# Patient Record
Sex: MALE | Race: WHITE | Employment: FULL TIME | URBAN - METROPOLITAN AREA
[De-identification: names, ages, dates, MRNs, and addresses within clinical notes are randomized per-mention and may not be internally consistent; named-entity substitution may affect disease eponyms.]

---

## 2017-01-18 PROBLEM — I10 ESSENTIAL (PRIMARY) HYPERTENSION: Status: ACTIVE | Noted: 2017-01-18

## 2017-10-12 ENCOUNTER — HOSPITAL ENCOUNTER (OUTPATIENT)
Dept: ULTRASOUND IMAGING | Age: 37
Discharge: HOME | End: 2017-10-12
Attending: INTERNAL MEDICINE
Payer: COMMERCIAL

## 2017-10-12 DIAGNOSIS — M79.89 LEFT ARM SWELLING: ICD-10-CM

## 2017-10-12 PROCEDURE — 93971 EXTREMITY STUDY: CPT

## 2018-04-02 PROBLEM — E66.01 OBESITY, MORBID (HCC): Status: ACTIVE | Noted: 2018-04-02

## 2018-08-27 ENCOUNTER — HOSPITAL ENCOUNTER (OUTPATIENT)
Dept: PHYSICAL THERAPY | Age: 38
Discharge: HOME | End: 2018-08-27
Attending: INTERNAL MEDICINE
Payer: COMMERCIAL

## 2018-08-27 DIAGNOSIS — S43.422A SPRAIN OF LEFT ROTATOR CUFF CAPSULE, INITIAL ENCOUNTER: ICD-10-CM

## 2018-08-27 PROCEDURE — 97140 MANUAL THERAPY 1/> REGIONS: CPT

## 2018-08-27 PROCEDURE — 97161 PT EVAL LOW COMPLEX 20 MIN: CPT

## 2018-08-27 NOTE — THERAPY EVALUATION
Melody David  : 1980  Primary: [facility]  Secondary:  [facility] at [address], [address] [place] [address], [address]  [phone]   [phone]       OUTPATIENT PHYSICAL THERAPY:Initial Assessment 2018    ICD-10: Treatment Diagnosis: Pain in left shoulder (M25.512)    Stiffness of left shoulder, not elsewhere classified (M25.612)    Precautions/Allergies:   Review of patient's allergies indicates no known allergies. Fall Risk Score: 1 (? 5 = High Risk)  MD Orders: Evaluate and treat MEDICAL/REFERRING DIAGNOSIS:  Sprain of left rotator cuff capsule, initial encounter [C51.990T]   DATE OF ONSET: 2017  REFERRING PHYSICIAN: Jeremie Oliveros MD  RETURN PHYSICIAN APPOINTMENT: 2018   This section established at most recent assessment  ASSESSMENT:  Melody David is a 42 y.o. male who presents to physical therapy for sudden onset of L shoulder pain. This session, pt demonstrated decreased L shoulder strength/endurance, pain with L shoulder mobility, multiple postural deficits, and decreased functional mobility as evident by a score of 22/55 on the Disabilities of the Arm, Shoulder, and Hand Questionnaire (with higher scores indicating increased disability). Pt may benefit from skilled PT to address the above listed deficits to improve ability to perform pain-free ADLs/IADLs and to improve overall quality of life prior to discharge. PROBLEM LIST (Impacting functional limitations):  1. Decreased Strength  2. Decreased ADL/Functional Activities  3. Increased Pain  4. Decreased Flexibility/Joint Mobility  5. Edema/Girth INTERVENTIONS PLANNED:  1. Bed Mobility  2. Cold  3. Family Education  4. Heat  5. Home Exercise Program (HEP)  6. Manual Therapy  7. Neuromuscular Re-education/Strengthening  8. Range of Motion (ROM)  9. Therapeutic Activites  10. Therapeutic Exercise/Strengthening  11.  Transfer Training  12. Ultrasound (US)   TREATMENT PLAN:  Effective Dates: 8/27/2018 TO 10/26/2018 (60 days). Frequency/Duration: 1 time every other week for 60 Days  GOALS: (Goals have been discussed and agreed upon with patient.)  Discharge Goals: Time Frame: 60 days  1. Pt will be compliant and independent with HEP in order to increase UE strength/endurance/mobility to improve functional mobility and overall quality of life. 2. Pt will improve score on the Disabilities of the Arm, Shoulder, and Hand (DASH) Questionnaire to 11/55 in order to improve independence with ADLs and IADLs and to improve overall quality of life. 3. Pt will be able to demonstrate good body mechanics while lifting 20 lb object up from the floor in order to minimize pain while doing yardwork. Rehabilitation Potential For Stated Goals: Good  Regarding Son Lyman's therapy, I certify that the treatment plan above will be carried out by a therapist or under their direction. Thank you for this referral,  Luc Taylor DPT     Referring Physician Signature: Raegan Kelly MD              Date                    HISTORY:   History of Present Injury/Illness (Reason for Referral): *History per pt or pt's family except where otherwise noted  Pt states he started having pain in his L shoulder in December 2017, and it has progressively gotten worse over the past couple of weeks (states he did not notice any specific cause, but thinks it might be due to weightlifting or yardwork, or assisting his mom who had had a stroke and was living with him at the time). The pain at worst is 6-7/10 (aggravating activities include reaching behind him to grab something, trying to open a door, pulling heavy box out of truck, and weightlifting activities such as overhead press, pull downs, bicep curls, chest press). The pain at best is 0/10 (eases pain with ibuprofen and rest). The pain takes 30-40 minutes to ease off when it is aggravated.  Pt states occasionally he feels radiating pain down posterior L UE to elbow (does not feel pain past elbow)  Past Medical History/Comorbidities:   Mr. Hugh Finley  has a past medical history of Acquired hypothyroidism; Allergic rhinitis, cause unspecified; Essential (primary) hypertension (1/18/2017); Hashimoto's thyroiditis (x 13 yrs); Hypercholesterolemia; and Morbidly obese ([address] Mr. Hugh Finley  has a past surgical history that includes hx heent (1995). Social History/Living Environment:    lives with 20 month old son in 2 story house with 25 steps with rail on L going up (only goes up about twice a month); girlfriend comes over every day; has ramp into house; does yardwork (shoveling, riding mower, weedeat, dig holes for planting trees  Prior Level of Function/Work/Activity:  Works at [employer] (as  - has to walk around and make sure everything is up to code - does not have to do repairs himself); likes to do heavy weightlifting (but has had to ease off since injury)  Dominant Side:         RIGHT  Other Clinical Tests:          None yet (not covered by insurance yet)  Previous Treatment Approaches:          none  Personal Factors:          Sex:  male        Age:  42 y.o. Current Medications:    Current Outpatient Prescriptions:     losartan-hydroCHLOROthiazide (HYZAAR) 100-25 mg per tablet, Take 1 Tab by mouth daily. , Disp: 90 Tab, Rfl: 1    levothyroxine (SYNTHROID) 175 mcg tablet, Take 1 Tab by mouth Daily (before breakfast). , Disp: 90 Tab, Rfl: 1    fluticasone (FLONASE) 50 mcg/actuation nasal spray, 2 Sprays by Both Nostrils route daily. , Disp: 1 Bottle, Rfl: 0   Date Last Reviewed:  8/27/2018   # of Personal Factors/Comorbidities that affect the Plan of Care: 1-2: MODERATE COMPLEXITY   EXAMINATION:   Initial assessment on 8/27/2018  Observation/Orthostatic Postural Assessment:    The following postural deficits were noted in sitting:  loss of cervical lordosis, increased thoracic kyphosis, forward head, rounded shoulders, elevated B shoulders, posterior pelvic tilt   The following postural deficits were noted in standing: slight forward trunk flexion  Palpation:          No tenderness with palpation  ROM:    Initial measurement: (on 8/27/2018) Initial measurement: (on 8/27/2018) Reassessment measurement:  Reassessment measurement:    L UE: WFL, but painful with end-range shoulder flexion, ER, and IR in lateral L shoulder R UE: WFL         Initial measurement: (on 8/27/2018) Reassessment measurement: Reassessment measurement:    [facility]    Thoracic AROM WFL            Strength:    Initial measurement: (on 8/27/2018) Initial measurement: (on 8/27/2018) Reassessment measurement:  Reassessment measurement:    L UE:  Shoulder flexion: 4+/5 (slight pain in lateral shoulder)  Shoulder extension: 5/5  Shoulder abduction: 4/5   Shoulder adduction: 5/5  Shoulder IR: 5/5  Shoulder ER: 4/5  Elbow flexion: 5/5  Elbow extension: 5/5 R UE:  Shoulder flexion: 4+/5  Shoulder extension: 5/5  Shoulder abduction: 4+/5  Shoulder adduction: 5/5  Shoulder IR: 5/5  Shoulder ER: 4+/5  Elbow flexion: 5/5  Elbow extension: 5/5       Special Tests:          Empty can test: + for weakness and more pain with IR at L shoulder  Neer's impingement test: + for pain at L and R with IR  Hawkin's-Brenton test: -  Neurological Screen:        Myotomes:  WFL        Dermatomes:  WFL     Body Structures Involved:  1. Nerves  2. Bones  3. Joints  4. Muscles  5. Ligaments Body Functions Affected:  1. Sensory/Pain  2. Neuromusculoskeletal  3. Movement Related Activities and Participation Affected:  1. General Tasks and Demands  2. Mobility  3. Self Care  4. Domestic Life  5. Interpersonal Interactions and Relationships  6.  Community, Social and [place]   # of elements that affect the Plan of Care: 4+: HIGH COMPLEXITY   CLINICAL PRESENTATION:   Presentation: Stable and uncomplicated: LOW COMPLEXITY   CLINICAL DECISION MAKING:   Outcome Measure: Tool Used: Disabilities of the Arm, Shoulder and Hand (DASH) Questionnaire - Quick Version  Score:  Initial: 22/55  Most Recent: X/55 (Date: -- )   Interpretation of Score: The DASH is designed to measure the activities of daily living in person's with upper extremity dysfunction or pain. Each section is scored on a 1-5 scale, 5 representing the greatest disability. The scores of each section are added together for a total score of 55. Score 11 12-19 20-28 29-37 38-45 46-54 55   Modifier CH CI CJ CK CL CM CN     Payor: BLUE CROSS / Plan: [place] BLUE CROSS [facility] / Product Type: PPO /   Medical Necessity:   · Patient is expected to demonstrate progress in strength, range of motion and functional technique to improve ability to perform pain-free ADLs/IADLs and to improve overall quality of life. · Patient demonstrates good rehab potential due to higher previous functional level. Reason for Services/Other Comments:  · Patient continues to require modification of therapeutic interventions to increase complexity of exercises. Use of outcome tool(s) and clinical judgement create a POC that gives a: Questionable prediction of patient's progress: MODERATE COMPLEXITY   TREATMENT:   (In addition to Assessment/Re-Assessment sessions the following treatments were rendered)  Subjective comments at beginning of session: See history above  THERAPEUTIC EXERCISE: (15 minutes):  Exercises per grid below to improve mobility, strength and dynamic movement of shoulder - left to improve functional lifting, carrying, reaching and overhead activites. Required minimal visual, verbal and manual cues to promote proper body alignment, promote proper body posture, promote proper body mechanics and promote proper body breathing techniques. Progressed resistance, range, repetitions and complexity of movement as indicated.     Date:  8/27/2018 Date:   Date:   Activity/Exercise Parameters Parameters    Standing shoulder ER/IR* Yellow theratubing resistance; 10-12 reps/1 set L UE each direction; cues for upright posture throughout     Standing shoulder rows* 17.5 lb resistance; 20 reps/1 set; cues for avoiding shoulder elevation; cues for scapular retraction and good posture throughout     Standing shoulder extension* 17.5 lb resistance; 20 reps/1 set B UEs; cues to maintain elbow extension and avoid shoulder elevation; cues to maintain neutral cervical position     Standing shoulder scaption* 3 lb resistance; 15 reps/1 set B UEs with cues to avoid scaption past 90 degrees; cues for correct movement pattern                       *given in HEP  [employer] Portal      Treatment/Session Assessment:  See assessment above. · Pain/ Symptoms: Initial:   No specific pain level verbalized at beginning of session Post Session:  No change in pain level stated at end of session ·   Compliance with Program/Exercises: Will assess as treatment progresses. · Recommendations/Intent for next treatment session: \"Next visit will focus on advancements to more challenging activities and reduction in assistance provided\".  Work on developing HEP for scapular stabilization and stretching tight muscles  Total Treatment Duration:  PT Patient Time In/Time Out  Time In: 1057  Time Out: [address], DPT    Future Appointments  Date Time Provider Renae Soler   9/10/2018 11:15 AM Zora Chang DPT [facility]   9/28/2018 8:00 AM Jay Randhawa MD [facility]

## 2018-08-27 NOTE — PROGRESS NOTES
Ambulatory/Rehab Services H2 Model Falls Risk Assessment    Risk Factor Pts. ·   Confusion/Disorientation/Impulsivity  []    4 ·   Symptomatic Depression  []   2 ·   Altered Elimination  []   1 ·   Dizziness/Vertigo  []   1 ·   Gender (Male)  [x]   1 ·   Any administered antiepileptics (anticonvulsants):  []   2 ·   Any administered benzodiazepines:  []   1 ·   Visual Impairment (specify):  []   1 ·   Portable Oxygen Use  []   1 ·   Orthostatic ? BP  []   1 ·   History of Recent Falls (within 3 mos.)  []   5     Ability to Rise from Chair (choose one) Pts. ·   Ability to rise in a single movement  [x]   0 ·   Pushes up, successful in one attempt  []   1 ·   Multiple attempts, but successful  []   3 ·   Unable to rise without assistance  []   4   Total: (5 or greater = High Risk) 1     Falls Prevention Plan:   []                Physical Limitations to Exercise (specify):   []                Mobility Assistance Device (type):   []                Exercise/Equipment Adaptation (specify):    ©2010 [facility] [address] Patent #[id number].  Federal Law prohibits the replication, distribution or use without written permission from [facility] [employer]

## 2018-08-27 NOTE — PROGRESS NOTES
Shoulder Rows    Instructions: Holding elastic band with both hands (palms facing each other), draw back the band as you bend your elbows and squeeze your shoulder blades together. Keep your elbows near the side of your body. Return to starting position and repeat. Repeat: 20 times Complete: 1 sets Perform: 1 times/day     Shoulder Extension    Instructions: Holding elastic band with both hands with both arms in front of you with your elbows straight, pull the band downwards and back towards your sides. Avoid elevating your shoulders during the motion. Repeat: 20 times Complete: 1 sets Perform: 1 times/day     Shoulder External Rotation    Instructions: While holding an elastic band at your side with your elbow bent, start with your hand near your stomach and then pull the band away. Keep your elbow at your side the entire time. Repeat: 20 times Complete: 1 sets Perform: 1 times/day     Shoulder Internal Rotation    Instructions: While holding an elastic band at your side with your elbow bent, start with your hand away from your stomach, then pull the band towards your stomach. Keep you elbow near your side the entire time.     Repeat: 20 times Complete: 1 sets Perform: 1 times/day

## 2018-09-10 ENCOUNTER — HOSPITAL ENCOUNTER (OUTPATIENT)
Dept: PHYSICAL THERAPY | Age: 38
Discharge: HOME | End: 2018-09-10
Attending: INTERNAL MEDICINE
Payer: COMMERCIAL

## 2018-09-10 PROCEDURE — 97110 THERAPEUTIC EXERCISES: CPT

## 2018-09-10 NOTE — PROGRESS NOTES
Renetta Cortes  : 1980  Primary: [facility]  Secondary:  Therapy Center at [address] [place] [address], [place], [facility]  [phone]   [phone]       OUTPATIENT PHYSICAL THERAPY:Daily Note 9/10/2018    ICD-10: Treatment Diagnosis: Pain in left shoulder (M25.512)    Stiffness of left shoulder, not elsewhere classified (M25.612)    Precautions/Allergies:   Review of patient's allergies indicates no known allergies. Fall Risk Score: 1 (? 5 = High Risk)  MD Orders: Evaluate and treat MEDICAL/REFERRING DIAGNOSIS:  Sprain of left rotator cuff capsule, initial encounter [B42.736C]   DATE OF ONSET: 2017  REFERRING PHYSICIAN: Darian Kwan MD  RETURN PHYSICIAN APPOINTMENT: 2018   This section established at most recent assessment  ASSESSMENT:  Renetta Cortes is a 42 y.o. male who presents to physical therapy for sudden onset of L shoulder pain. This session, pt demonstrated decreased L shoulder strength/endurance, pain with L shoulder mobility, multiple postural deficits, and decreased functional mobility as evident by a score of 22/55 on the Disabilities of the Arm, Shoulder, and Hand Questionnaire (with higher scores indicating increased disability). Pt may benefit from skilled PT to address the above listed deficits to improve ability to perform pain-free ADLs/IADLs and to improve overall quality of life prior to discharge. PROBLEM LIST (Impacting functional limitations):  1. Decreased Strength  2. Decreased ADL/Functional Activities  3. Increased Pain  4. Decreased Flexibility/Joint Mobility  5. Edema/Girth INTERVENTIONS PLANNED:  1. Bed Mobility  2. Cold  3. Family Education  4. Heat  5. Home Exercise Program (HEP)  6. Manual Therapy  7. Neuromuscular Re-education/Strengthening  8. Range of Motion (ROM)  9. Therapeutic Activites  10. Therapeutic Exercise/Strengthening  11.  Transfer Training  12. Ultrasound (US)   TREATMENT PLAN:  Effective Dates: 8/27/2018 TO 10/26/2018 (60 days). Frequency/Duration: 1 time every other week for 60 Days  GOALS: (Goals have been discussed and agreed upon with patient.)  Discharge Goals: Time Frame: 60 days  1. Pt will be compliant and independent with HEP in order to increase UE strength/endurance/mobility to improve functional mobility and overall quality of life. 2. Pt will improve score on the Disabilities of the Arm, Shoulder, and Hand (DASH) Questionnaire to 11/55 in order to improve independence with ADLs and IADLs and to improve overall quality of life. 3. Pt will be able to demonstrate good body mechanics while lifting 20 lb object up from the floor in order to minimize pain while doing yardwork. Rehabilitation Potential For Stated Goals: Good  Regarding Darion Vargasariel Dobbins's therapy, I certify that the treatment plan above will be carried out by a therapist or under their direction. Thank you for this referral,  Shani Irene DPT     Referring Physician Signature: Se Morgan MD              Date                    HISTORY:   History of Present Injury/Illness (Reason for Referral): *History per pt or pt's family except where otherwise noted  Pt states he started having pain in his L shoulder in December 2017, and it has progressively gotten worse over the past couple of weeks (states he did not notice any specific cause, but thinks it might be due to weightlifting or yardwork, or assisting his mom who had had a stroke and was living with him at the time). The pain at worst is 6-7/10 (aggravating activities include reaching behind him to grab something, trying to open a door, pulling heavy box out of truck, and weightlifting activities such as overhead press, pull downs, bicep curls, chest press). The pain at best is 0/10 (eases pain with ibuprofen and rest). The pain takes 30-40 minutes to ease off when it is aggravated.  Pt states occasionally he feels radiating pain down posterior L UE to elbow (does not feel pain past elbow)  Past Medical History/Comorbidities:   Mr. Monty Alex  has a past medical history of Acquired hypothyroidism; Allergic rhinitis, cause unspecified; Essential (primary) hypertension (1/18/2017); Hashimoto's thyroiditis (x 13 yrs); Hypercholesterolemia; and Morbidly obese ([address] Mr. Monty Alex  has a past surgical history that includes hx heent (1995). Social History/Living Environment:    lives with 20 month old son in 2 story house with 25 steps with rail on L going up (only goes up about twice a month); girlfriend comes over every day; has ramp into house; does yardwork (shoveling, riding mower, weedeat, dig holes for planting trees  Prior Level of Function/Work/Activity:  Works at [employer] (as  - has to walk around and make sure everything is up to code - does not have to do repairs himself); likes to do heavy weightlifting (but has had to ease off since injury)  Dominant Side:         RIGHT  Other Clinical Tests:          None yet (not covered by insurance yet)  Previous Treatment Approaches:          none  Personal Factors:          Sex:  male        Age:  42 y.o. Current Medications:    Current Outpatient Prescriptions:     losartan-hydroCHLOROthiazide (HYZAAR) 100-25 mg per tablet, Take 1 Tab by mouth daily. , Disp: 90 Tab, Rfl: 1    levothyroxine (SYNTHROID) 175 mcg tablet, Take 1 Tab by mouth Daily (before breakfast). , Disp: 90 Tab, Rfl: 1    fluticasone (FLONASE) 50 mcg/actuation nasal spray, 2 Sprays by Both Nostrils route daily. , Disp: 1 Bottle, Rfl: 0   Date Last Reviewed:  9/10/2018   # of Personal Factors/Comorbidities that affect the Plan of Care: 1-2: MODERATE COMPLEXITY   EXAMINATION:   Initial assessment on 8/27/2018  Observation/Orthostatic Postural Assessment:    The following postural deficits were noted in sitting:  loss of cervical lordosis, increased thoracic kyphosis, forward head, rounded shoulders, elevated B shoulders, posterior pelvic tilt   The following postural deficits were noted in standing: slight forward trunk flexion  Palpation:          No tenderness with palpation  ROM:    Initial measurement: (on 8/27/2018) Initial measurement: (on 8/27/2018) Reassessment measurement:  Reassessment measurement:    L UE: WFL, but painful with end-range shoulder flexion, ER, and IR in lateral L shoulder R UE: WFL         Initial measurement: (on 8/27/2018) Reassessment measurement: Reassessment measurement:    [facility]    Thoracic AROM WFL            Strength:    Initial measurement: (on 8/27/2018) Initial measurement: (on 8/27/2018) Reassessment measurement:  Reassessment measurement:    L UE:  Shoulder flexion: 4+/5 (slight pain in lateral shoulder)  Shoulder extension: 5/5  Shoulder abduction: 4/5   Shoulder adduction: 5/5  Shoulder IR: 5/5  Shoulder ER: 4/5  Elbow flexion: 5/5  Elbow extension: 5/5 R UE:  Shoulder flexion: 4+/5  Shoulder extension: 5/5  Shoulder abduction: 4+/5  Shoulder adduction: 5/5  Shoulder IR: 5/5  Shoulder ER: 4+/5  Elbow flexion: 5/5  Elbow extension: 5/5       Special Tests:          Empty can test: + for weakness and more pain with IR at L shoulder  Neer's impingement test: + for pain at L and R with IR  Hawkin's-Brenton test: -  Neurological Screen:        Myotomes:  WFL        Dermatomes:  WFL     Body Structures Involved:  1. Nerves  2. Bones  3. Joints  4. Muscles  5. Ligaments Body Functions Affected:  1. Sensory/Pain  2. Neuromusculoskeletal  3. Movement Related Activities and Participation Affected:  1. General Tasks and Demands  2. Mobility  3. Self Care  4. Domestic Life  5. Interpersonal Interactions and Relationships  6.  Community, Social and [place]   # of elements that affect the Plan of Care: 4+: HIGH COMPLEXITY   CLINICAL PRESENTATION:   Presentation: Stable and uncomplicated: LOW COMPLEXITY   CLINICAL DECISION MAKING:   Outcome Measure: Tool Used: Disabilities of the Arm, Shoulder and Hand (DASH) Questionnaire - Quick Version  Score:  Initial: 22/55  Most Recent: X/55 (Date: -- )   Interpretation of Score: The DASH is designed to measure the activities of daily living in person's with upper extremity dysfunction or pain. Each section is scored on a 1-5 scale, 5 representing the greatest disability. The scores of each section are added together for a total score of 55. Score 11 12-19 20-28 29-37 38-45 46-54 55   Modifier CH CI CJ CK CL CM CN     Payor: BLUE CROSS / Plan: [place] BLUE CROSS [facility] / Product Type: PPO /   Medical Necessity:   · Patient is expected to demonstrate progress in strength, range of motion and functional technique to improve ability to perform pain-free ADLs/IADLs and to improve overall quality of life. · Patient demonstrates good rehab potential due to higher previous functional level. Reason for Services/Other Comments:  · Patient continues to require modification of therapeutic interventions to increase complexity of exercises. Use of outcome tool(s) and clinical judgement create a POC that gives a: Questionable prediction of patient's progress: MODERATE COMPLEXITY   TREATMENT:   (In addition to Assessment/Re-Assessment sessions the following treatments were rendered)  Subjective comments at beginning of session: pt states pain is still in L shoulder (although he states it is better) - states pain is migrated to L elbow as well (near olecranon process) and he feels a sharp pain in it if he fully extends it (pt noted to have slight elbow hyperextension)  THERAPEUTIC EXERCISE: (41 minutes):  Exercises per grid below to improve mobility, strength and dynamic movement of shoulder - left to improve functional lifting, carrying, reaching and overhead activites.   Required minimal visual, verbal and manual cues to promote proper body alignment, promote proper body posture, promote proper body mechanics and promote proper body breathing techniques. Progressed resistance, range, repetitions and complexity of movement as indicated.     Date:  8/27/2018 Date:  9/10/2018 Date:   Activity/Exercise Parameters Parameters    Standing shoulder ER/IR* Yellow theratubing resistance; 10-12 reps/1 set L UE each direction; cues for upright posture throughout 2.5 lb resistance; 20 reps/1 set L UE each direction; cues for upright posture throughout and avoiding elbow pain by keeping elbow in position slightly less than 90 degrees flexion    Standing shoulder rows* 17.5 lb resistance; 20 reps/1 set; cues for avoiding shoulder elevation; cues for scapular retraction and good posture throughout 17.5 lb resistance; 20 reps/1 set; cues for avoiding shoulder elevation; cues for scapular retraction and good posture throughout    Standing shoulder extension* 17.5 lb resistance; 20 reps/1 set B UEs; cues to maintain elbow extension and avoid shoulder elevation; cues to maintain neutral cervical position 17.5 lb resistance; 20 reps/1 set B UEs; cues to maintain elbow extension and avoid shoulder elevation; cues to maintain neutral cervical position    Standing shoulder scaption* 3 lb resistance; 15 reps/1 set B UEs with cues to avoid scaption past 90 degrees; cues for correct movement pattern 3 lb resistance; 20 reps/1 set B UEs with cues to avoid scaption past 90 degrees; cues for correct movement pattern    UBE  L4 resistance for 4 minutes forward and 4 minutes backward    Doorway stretch for pectoral muscles  30 second hold x 3 reps with cues for upright posture and hands at 30 degree abduction angle  Attempted with shoulders positioned in flexion but discontinued due to pain in superior shoulders    Tricep press at [place] machine  12.5 lb resistance; 20 reps/1 set with cues to avoid full elbow extension    Chest press in supine  In hooklying position; 7 lb weight each UE; 20 reps/1 set B UEs with cues to maintain stable scapular position Overhead press in sitting  5 lb weight each UE; 20 reps/1 set B UEs with cues to maintain stable scapular position and cues to avoid going down past 90 degrees abduction    *given in HEP  Med[employer] Portal      Treatment/Session Assessment:  Pt was able to demonstrate exercises with good technique, but did require several cues throughout for good scapular positioning throughout exercise to avoid shoulder pain, as well as to avoid elbow hyperextension to avoid elbow pain. Cued pt to start with low weights at the gym to focus on good body mechanics, especially with chest press and overhead press. · Pain/ Symptoms: Initial:   No pain Post Session:  No change in pain level stated at end of session ·   Compliance with Program/Exercises: Will assess as treatment progresses. · Recommendations/Intent for next treatment session: \"Next visit will focus on advancements to more challenging activities and reduction in assistance provided\".  Work on developing HEP for scapular stabilization and stretching tight muscles  Total Treatment Duration:  PT Patient Time In/Time Out  Time In: 1116  Time Out: [facility], DPT    Future Appointments  Date Time Provider Renae Soler   9/25/2018 8:45 AM Trygve Bumpers, DPT [facility]   9/28/2018 8:00 AM Ap Wheeler MD [facility]

## 2018-09-24 NOTE — THERAPY DISCHARGE
Rosina Almeida  : 1980  Primary: [facility]  Secondary:  Therapy Center at [facility], [facility], [address]  [phone]   [phone]       OUTPATIENT PHYSICAL THERAPY:Discontinuation Summary 2018    ICD-10: Treatment Diagnosis: Pain in left shoulder (M25.512)    Stiffness of left shoulder, not elsewhere classified (M25.612)    Precautions/Allergies:   Review of patient's allergies indicates no known allergies. Fall Risk Score: 1 (? 5 = High Risk)  MD Orders: Evaluate and treat MEDICAL/REFERRING DIAGNOSIS:  Sprain of left rotator cuff capsule, initial encounter [S43.860L]   DATE OF ONSET: 2017  REFERRING PHYSICIAN: Sharyl Kayser, MD  RETURN PHYSICIAN APPOINTMENT: 2018   This section established at most recent assessment  Rosina Almeida has been seen in physical therapy from 2018 to 9/10/2018 for 2 visits. Treatment has been discontinued at this time due to pt requesting discharge at this time. The below goals were not assessed prior to discontinuation secondary to pt failing to attend additional sessions. Thank you for this referral.       PROBLEM LIST (Impacting functional limitations):  1. Decreased Strength  2. Decreased ADL/Functional Activities  3. Increased Pain  4. Decreased Flexibility/Joint Mobility  5. Edema/Girth INTERVENTIONS PLANNED:  1. Bed Mobility  2. Cold  3. Family Education  4. Heat  5. Home Exercise Program (HEP)  6. Manual Therapy  7. Neuromuscular Re-education/Strengthening  8. Range of Motion (ROM)  9. Therapeutic Activites  10. Therapeutic Exercise/Strengthening  11. Transfer Training  12. Ultrasound (US)   TREATMENT PLAN:  Effective Dates: 2018 TO 10/26/2018 (60 days). Frequency/Duration: 1 time every other week for 60 Days  GOALS: (Goals have been discussed and agreed upon with patient.)  Discharge Goals: Time Frame: 60 days  1.  Pt will be compliant and independent with HEP in order to increase UE strength/endurance/mobility to improve functional mobility and overall quality of life. 2. Pt will improve score on the Disabilities of the Arm, Shoulder, and Hand (DASH) Questionnaire to 11/55 in order to improve independence with ADLs and IADLs and to improve overall quality of life. 3. Pt will be able to demonstrate good body mechanics while lifting 20 lb object up from the floor in order to minimize pain while doing yardwork. Rehabilitation Potential For Stated Goals: Good  Regarding Tatyana Dobbins's therapy, I certify that the treatment plan above will be carried out by a therapist or under their direction.   Thank you for this referral,  Trish Diez DPT

## 2018-09-25 ENCOUNTER — HOSPITAL ENCOUNTER (OUTPATIENT)
Dept: PHYSICAL THERAPY | Age: 38
Discharge: HOME | End: 2018-09-25
Attending: INTERNAL MEDICINE
Payer: COMMERCIAL

## 2022-03-19 PROBLEM — E66.01 OBESITY, MORBID (HCC): Status: ACTIVE | Noted: 2018-04-02

## 2022-03-19 PROBLEM — I10 ESSENTIAL (PRIMARY) HYPERTENSION: Status: ACTIVE | Noted: 2017-01-18

## 2022-06-20 ENCOUNTER — OFFICE VISIT (OUTPATIENT)
Dept: INTERNAL MEDICINE CLINIC | Facility: CLINIC | Age: 42
End: 2022-06-20
Payer: COMMERCIAL

## 2022-06-20 VITALS
DIASTOLIC BLOOD PRESSURE: 88 MMHG | BODY MASS INDEX: 41.75 KG/M2 | HEART RATE: 72 BPM | WEIGHT: 315 LBS | TEMPERATURE: 97 F | SYSTOLIC BLOOD PRESSURE: 127 MMHG | HEIGHT: 73 IN | OXYGEN SATURATION: 98 %

## 2022-06-20 DIAGNOSIS — E66.01 OBESITY, MORBID (HCC): ICD-10-CM

## 2022-06-20 DIAGNOSIS — R73.03 PRE-DIABETES: ICD-10-CM

## 2022-06-20 DIAGNOSIS — J30.9 ALLERGIC RHINITIS, UNSPECIFIED SEASONALITY, UNSPECIFIED TRIGGER: ICD-10-CM

## 2022-06-20 DIAGNOSIS — E03.9 ACQUIRED HYPOTHYROIDISM: ICD-10-CM

## 2022-06-20 DIAGNOSIS — I10 ESSENTIAL (PRIMARY) HYPERTENSION: Primary | ICD-10-CM

## 2022-06-20 DIAGNOSIS — E78.49 OTHER HYPERLIPIDEMIA: ICD-10-CM

## 2022-06-20 PROCEDURE — 99214 OFFICE O/P EST MOD 30 MIN: CPT | Performed by: INTERNAL MEDICINE

## 2022-06-20 RX ORDER — ORAL SEMAGLUTIDE 7 MG/1
7 TABLET ORAL DAILY
Qty: 90 TABLET | Refills: 1 | Status: SHIPPED | OUTPATIENT
Start: 2022-06-20 | End: 2022-10-21 | Stop reason: SDUPTHER

## 2022-06-20 RX ORDER — MONTELUKAST SODIUM 10 MG/1
10 TABLET ORAL DAILY
Qty: 90 TABLET | Refills: 1 | Status: SHIPPED | OUTPATIENT
Start: 2022-06-20 | End: 2022-10-20 | Stop reason: SDUPTHER

## 2022-06-20 RX ORDER — HYDROCHLOROTHIAZIDE 25 MG/1
25 TABLET ORAL DAILY
Qty: 90 TABLET | Refills: 1 | Status: SHIPPED | OUTPATIENT
Start: 2022-06-20 | End: 2022-10-20 | Stop reason: SDUPTHER

## 2022-06-20 RX ORDER — LOSARTAN POTASSIUM 100 MG/1
100 TABLET ORAL DAILY
Qty: 90 TABLET | Refills: 1 | Status: SHIPPED | OUTPATIENT
Start: 2022-06-20 | End: 2022-10-20 | Stop reason: SDUPTHER

## 2022-06-20 RX ORDER — LEVOTHYROXINE SODIUM 175 UG/1
175 TABLET ORAL
Qty: 90 TABLET | Refills: 1 | Status: SHIPPED | OUTPATIENT
Start: 2022-06-20 | End: 2022-10-20 | Stop reason: SDUPTHER

## 2022-06-20 ASSESSMENT — ANXIETY QUESTIONNAIRES
5. BEING SO RESTLESS THAT IT IS HARD TO SIT STILL: 0
7. FEELING AFRAID AS IF SOMETHING AWFUL MIGHT HAPPEN: 0
1. FEELING NERVOUS, ANXIOUS, OR ON EDGE: 0
6. BECOMING EASILY ANNOYED OR IRRITABLE: 0
2. NOT BEING ABLE TO STOP OR CONTROL WORRYING: 0
GAD7 TOTAL SCORE: 0
4. TROUBLE RELAXING: 0
3. WORRYING TOO MUCH ABOUT DIFFERENT THINGS: 0

## 2022-06-20 ASSESSMENT — ENCOUNTER SYMPTOMS
ABDOMINAL PAIN: 0
SHORTNESS OF BREATH: 0

## 2022-06-20 ASSESSMENT — PATIENT HEALTH QUESTIONNAIRE - PHQ9
SUM OF ALL RESPONSES TO PHQ QUESTIONS 1-9: 0
1. LITTLE INTEREST OR PLEASURE IN DOING THINGS: 0
SUM OF ALL RESPONSES TO PHQ QUESTIONS 1-9: 0
2. FEELING DOWN, DEPRESSED OR HOPELESS: 0
SUM OF ALL RESPONSES TO PHQ QUESTIONS 1-9: 0
SUM OF ALL RESPONSES TO PHQ QUESTIONS 1-9: 0
SUM OF ALL RESPONSES TO PHQ9 QUESTIONS 1 & 2: 0

## 2022-06-20 NOTE — PROGRESS NOTES
Leoncio Pretty M.D. Internal Medicine  [address] , [address]   Office : [phone]  Fax : [phone]    Chief Complaint   Patient presents with    Hypertension       History of Present Illness:  Reji Ruiz is a 35 y.o. male. HPI    Hypertension  Patient is in for Hypertension which is stable. Diet and Lifestyle: generally follows a low sodium diet  exercises sporadically  nonsmoker  Home BP Monitoring: is not measured at home . Patient's recent blood pressures were   BP Readings from Last 3 Encounters:   06/20/22 127/88   02/11/22 134/88   11/05/21 (!) 135/90   . taking medications as instructed, no medication side effects noted, no TIA's, no chest pain on exertion, no dyspnea on exertion, no swelling of ankles. Cardiac risk factors consist of dyslipidemia, hypertension, male gender and obesity (BMI >= 30 kg/m2). Hyperlipidemia  Patient is in for follow-up for hyperlipidemia. Diet and Lifestyle: generally follows a low fat low cholesterol diet. Risk factors for vascular disease consist of hyperlipidemia and hypertension. Last LDL was   Lab Results   Component Value Date    LDLCALC 153 (H) 05/06/2021   . Last ALT was   Lab Results   Component Value Date    ALT 30 05/06/2021   . No muscle aches. Hypothyroidism  He presents for follow up of  hypothyroidism and reports  no new problems. Janet Reaves He reports his symptoms are  stable. The patient currently is taking thyroid replacement. Lab Results   Component Value Date    TSH 2.790 11/05/2021   . Weight Loss/IFG/Pre-Diabetes  Stopped metformin over concern about sexual function and is gaining weight.   Recommend he restart and will add rybelsus        Past Medical History:  Past Medical History:   Diagnosis Date    Acquired hypothyroidism     Allergic rhinitis, cause unspecified     Essential (primary) hypertension 1/18/2017    Hashimoto's thyroiditis x 13 yrs  Hypercholesterolemia     Morbidly obese ([facility] [address]     bmi=36     Past Surgical History:  Past Surgical History:   Procedure Laterality Date    HEENT  1995    jaw set--- no limitation per pt     Allergies:   No Known Allergies  Medications:   Current Outpatient Medications   Medication Sig Dispense Refill    hydroCHLOROthiazide (HYDRODIURIL) 25 MG tablet Take 1 tablet by mouth daily 90 tablet 1    levothyroxine (SYNTHROID) 175 MCG tablet Take 1 tablet by mouth every morning (before breakfast) 90 tablet 1    losartan (COZAAR) 100 MG tablet Take 1 tablet by mouth daily 90 tablet 1    montelukast (SINGULAIR) 10 MG tablet Take 1 tablet by mouth daily 90 tablet 1    Semaglutide (RYBELSUS) 7 MG TABS Take 7 mg by mouth daily 90 tablet 1    metFORMIN (GLUCOPHAGE) 500 MG tablet Take 500 mg by mouth 2 times daily (with meals) (Patient not taking: Reported on 6/20/2022)       No current facility-administered medications for this visit. Social History:  Social History     Tobacco Use    Smoking status: Never Smoker    Smokeless tobacco: Never Used   Substance Use Topics    Alcohol use: Yes     Alcohol/week: 6.0 - 7.0 standard drinks     Comment: twice a month     Family History  Family History   Problem Relation Age of Onset    Hypertension Mother     Diabetes Mother     No Known Problems Father        Review of Systems   Constitutional: Negative for activity change, appetite change, chills, fatigue and fever. Respiratory: Negative for shortness of breath. Cardiovascular: Negative for chest pain and leg swelling. Gastrointestinal: Negative for abdominal pain. Musculoskeletal: Positive for myalgias. Skin: Negative for rash. Psychiatric/Behavioral: Negative for confusion.          Vital Signs  /88 (Site: Right Upper Arm, Position: Sitting, Cuff Size: Large Adult)   Pulse 72   Temp 97 °F (36.1 °C) (Temporal)   Ht 6' 1\" (1.854 m)   Wt (!) 336 lb (152.4 kg)   SpO2 98%   BMI 44.33 kg/m²   Body mass index is 44.33 kg/m². Physical Exam  Vitals reviewed. Constitutional:       General: He is not in acute distress. Appearance: Normal appearance. He is obese. He is not ill-appearing. HENT:      Head: Normocephalic and atraumatic. Mouth/Throat:      Mouth: Mucous membranes are moist.   Eyes:      General: No scleral icterus. Extraocular Movements: Extraocular movements intact. Conjunctiva/sclera: Conjunctivae normal.   Neck:      Vascular: No carotid bruit. Cardiovascular:      Rate and Rhythm: Normal rate and regular rhythm. Heart sounds: Normal heart sounds. No murmur heard. Pulmonary:      Effort: Pulmonary effort is normal.      Breath sounds: Normal breath sounds. Musculoskeletal:         General: No swelling. Skin:     Coloration: Skin is not jaundiced. Findings: No rash. Neurological:      General: No focal deficit present. Mental Status: He is alert. Mental status is at baseline. Cranial Nerves: No cranial nerve deficit. Motor: No weakness. Gait: Gait normal.   Psychiatric:         Mood and Affect: Mood normal.         Behavior: Behavior normal.         Thought Content: Thought content normal.         Judgment: Judgment normal.           Assessment/Plan:  Johan Ruiz was seen today for hypertension. Diagnoses and all orders for this visit:    Essential (primary) hypertension  -     hydroCHLOROthiazide (HYDRODIURIL) 25 MG tablet; Take 1 tablet by mouth daily  -     losartan (COZAAR) 100 MG tablet; Take 1 tablet by mouth daily    Acquired hypothyroidism  -     levothyroxine (SYNTHROID) 175 MCG tablet; Take 1 tablet by mouth every morning (before breakfast)    Other hyperlipidemia    Obesity, morbid (HCC)    Pre-diabetes  -     Semaglutide (RYBELSUS) 7 MG TABS; Take 7 mg by mouth daily    Allergic rhinitis, unspecified seasonality, unspecified trigger  -     montelukast (SINGULAIR) 10 MG tablet;  Take 1 tablet by mouth daily      First month of rybelsus samples provided  Return in about 4 months (around 10/20/2022), or if symptoms worsen or fail to improve.   __  Santos Hollingsworth M.D.

## 2022-07-13 ENCOUNTER — TELEPHONE (OUTPATIENT)
Dept: INTERNAL MEDICINE CLINIC | Facility: CLINIC | Age: 42
End: 2022-07-13

## 2022-07-13 NOTE — TELEPHONE ENCOUNTER
Bernardo informed patient that a PA is needed for Rybelsus.   Please call patient if there are any questions [phone]

## 2022-07-19 ENCOUNTER — TELEPHONE (OUTPATIENT)
Dept: INTERNAL MEDICINE CLINIC | Facility: CLINIC | Age: 42
End: 2022-07-19

## 2022-07-19 NOTE — TELEPHONE ENCOUNTER
----- Message from Rebecca Zuñiga sent at 7/19/2022  2:52 PM EDT -----  Subject: Medication Problem    Medication: Semaglutide (RYBELSUS) 7 MG TABS  Dosage: take 7 mg by mouth daily  Ordering Provider: Dr. Marjorie Hernandez    Question/Problem: Patient stated that [facility] faxed over a PA for   the Semaglutide(rybelsus) 7 MG tabs. He is calling for the status of the   PA. Can you please contact patient [address]: [address] [place] [address]  [phone] [facility]  [phone]; OK to leave message on voicemail    I called the pt and notified him that we do not have a documented A1c and his last labs were not even up-to-date enough to do the PA. I told him we would need to do labs at his next visit in order to get this covered. He verbalized understanding and was agreeable.

## 2022-07-19 NOTE — TELEPHONE ENCOUNTER
I called the pt and notified him that we do not have a documented A1c and his last labs were not even up-to-date enough to do the PA. I told him we would need to do labs at his next visit in order to get this covered. He verbalized understanding and was agreeable.

## 2022-10-20 ENCOUNTER — OFFICE VISIT (OUTPATIENT)
Dept: INTERNAL MEDICINE CLINIC | Facility: CLINIC | Age: 42
End: 2022-10-20
Payer: COMMERCIAL

## 2022-10-20 VITALS
WEIGHT: 315 LBS | TEMPERATURE: 97.9 F | BODY MASS INDEX: 41.75 KG/M2 | DIASTOLIC BLOOD PRESSURE: 81 MMHG | HEART RATE: 74 BPM | SYSTOLIC BLOOD PRESSURE: 141 MMHG | HEIGHT: 73 IN | RESPIRATION RATE: 97 BRPM

## 2022-10-20 DIAGNOSIS — E03.9 ACQUIRED HYPOTHYROIDISM: ICD-10-CM

## 2022-10-20 DIAGNOSIS — E78.49 OTHER HYPERLIPIDEMIA: ICD-10-CM

## 2022-10-20 DIAGNOSIS — I10 ESSENTIAL (PRIMARY) HYPERTENSION: ICD-10-CM

## 2022-10-20 DIAGNOSIS — R73.01 IMPAIRED FASTING GLUCOSE: Primary | ICD-10-CM

## 2022-10-20 DIAGNOSIS — Z12.5 SCREENING FOR PROSTATE CANCER: ICD-10-CM

## 2022-10-20 DIAGNOSIS — J30.9 ALLERGIC RHINITIS, UNSPECIFIED SEASONALITY, UNSPECIFIED TRIGGER: ICD-10-CM

## 2022-10-20 DIAGNOSIS — R73.01 IMPAIRED FASTING GLUCOSE: ICD-10-CM

## 2022-10-20 LAB
ALBUMIN SERPL-MCNC: 4.4 G/DL (ref 3.5–5)
ALBUMIN/GLOB SERPL: 1.3 {RATIO} (ref 0.4–1.6)
ALP SERPL-CCNC: 65 U/L (ref 50–136)
ALT SERPL-CCNC: 58 U/L (ref 12–65)
ANION GAP SERPL CALC-SCNC: 9 MMOL/L (ref 2–11)
AST SERPL-CCNC: 34 U/L (ref 15–37)
BASOPHILS # BLD: 0 K/UL (ref 0–0.2)
BASOPHILS NFR BLD: 0 % (ref 0–2)
BILIRUB DIRECT SERPL-MCNC: 0.1 MG/DL
BILIRUB SERPL-MCNC: 0.7 MG/DL (ref 0.2–1.1)
BUN SERPL-MCNC: 11 MG/DL (ref 6–23)
CALCIUM SERPL-MCNC: 9.7 MG/DL (ref 8.3–10.4)
CHLORIDE SERPL-SCNC: 104 MMOL/L (ref 101–110)
CHOLEST SERPL-MCNC: 211 MG/DL
CO2 SERPL-SCNC: 25 MMOL/L (ref 21–32)
CREAT SERPL-MCNC: 1.1 MG/DL (ref 0.8–1.5)
DIFFERENTIAL METHOD BLD: NORMAL
EOSINOPHIL # BLD: 0.1 K/UL (ref 0–0.8)
EOSINOPHIL NFR BLD: 2 % (ref 0.5–7.8)
ERYTHROCYTE [DISTWIDTH] IN BLOOD BY AUTOMATED COUNT: 13 % (ref 11.9–14.6)
GLOBULIN SER CALC-MCNC: 3.4 G/DL (ref 2.8–4.5)
GLUCOSE SERPL-MCNC: 103 MG/DL (ref 65–100)
HCT VFR BLD AUTO: 47 % (ref 41.1–50.3)
HDLC SERPL-MCNC: 42 MG/DL (ref 40–60)
HDLC SERPL: 5 {RATIO}
HGB BLD-MCNC: 15.6 G/DL (ref 13.6–17.2)
IMM GRANULOCYTES # BLD AUTO: 0 K/UL (ref 0–0.5)
IMM GRANULOCYTES NFR BLD AUTO: 1 % (ref 0–5)
LDLC SERPL CALC-MCNC: 129.6 MG/DL
LYMPHOCYTES # BLD: 1.5 K/UL (ref 0.5–4.6)
LYMPHOCYTES NFR BLD: 25 % (ref 13–44)
MCH RBC QN AUTO: 30.5 PG (ref 26.1–32.9)
MCHC RBC AUTO-ENTMCNC: 33.2 G/DL (ref 31.4–35)
MCV RBC AUTO: 92 FL (ref 82–102)
MONOCYTES # BLD: 0.5 K/UL (ref 0.1–1.3)
MONOCYTES NFR BLD: 9 % (ref 4–12)
NEUTS SEG # BLD: 3.7 K/UL (ref 1.7–8.2)
NEUTS SEG NFR BLD: 63 % (ref 43–78)
NRBC # BLD: 0 K/UL (ref 0–0.2)
PLATELET # BLD AUTO: 312 K/UL (ref 150–450)
PMV BLD AUTO: 10 FL (ref 9.4–12.3)
POTASSIUM SERPL-SCNC: 3.8 MMOL/L (ref 3.5–5.1)
PROT SERPL-MCNC: 7.8 G/DL (ref 6.3–8.2)
PSA SERPL-MCNC: 1.3 NG/ML
RBC # BLD AUTO: 5.11 M/UL (ref 4.23–5.6)
SODIUM SERPL-SCNC: 138 MMOL/L (ref 133–143)
TRIGL SERPL-MCNC: 197 MG/DL (ref 35–150)
TSH, 3RD GENERATION: 2.93 UIU/ML (ref 0.36–3.74)
VLDLC SERPL CALC-MCNC: 39.4 MG/DL (ref 6–23)
WBC # BLD AUTO: 5.8 K/UL (ref 4.3–11.1)

## 2022-10-20 PROCEDURE — 99214 OFFICE O/P EST MOD 30 MIN: CPT | Performed by: INTERNAL MEDICINE

## 2022-10-20 RX ORDER — LOSARTAN POTASSIUM 100 MG/1
100 TABLET ORAL DAILY
Qty: 90 TABLET | Refills: 1 | Status: SHIPPED | OUTPATIENT
Start: 2022-10-20

## 2022-10-20 RX ORDER — HYDROCHLOROTHIAZIDE 25 MG/1
25 TABLET ORAL DAILY
Qty: 90 TABLET | Refills: 1 | Status: SHIPPED | OUTPATIENT
Start: 2022-10-20

## 2022-10-20 RX ORDER — MONTELUKAST SODIUM 10 MG/1
10 TABLET ORAL DAILY
Qty: 90 TABLET | Refills: 1 | Status: SHIPPED | OUTPATIENT
Start: 2022-10-20

## 2022-10-20 RX ORDER — LEVOTHYROXINE SODIUM 175 UG/1
175 TABLET ORAL
Qty: 90 TABLET | Refills: 1 | Status: SHIPPED | OUTPATIENT
Start: 2022-10-20

## 2022-10-20 ASSESSMENT — ANXIETY QUESTIONNAIRES
2. NOT BEING ABLE TO STOP OR CONTROL WORRYING: 0
1. FEELING NERVOUS, ANXIOUS, OR ON EDGE: 0
6. BECOMING EASILY ANNOYED OR IRRITABLE: 0
4. TROUBLE RELAXING: 0
IF YOU CHECKED OFF ANY PROBLEMS ON THIS QUESTIONNAIRE, HOW DIFFICULT HAVE THESE PROBLEMS MADE IT FOR YOU TO DO YOUR WORK, TAKE CARE OF THINGS AT HOME, OR GET ALONG WITH OTHER PEOPLE: NOT DIFFICULT AT ALL
5. BEING SO RESTLESS THAT IT IS HARD TO SIT STILL: 0
7. FEELING AFRAID AS IF SOMETHING AWFUL MIGHT HAPPEN: 0
3. WORRYING TOO MUCH ABOUT DIFFERENT THINGS: 0
GAD7 TOTAL SCORE: 0

## 2022-10-20 ASSESSMENT — PATIENT HEALTH QUESTIONNAIRE - PHQ9
SUM OF ALL RESPONSES TO PHQ QUESTIONS 1-9: 0
SUM OF ALL RESPONSES TO PHQ QUESTIONS 1-9: 0
2. FEELING DOWN, DEPRESSED OR HOPELESS: 0
SUM OF ALL RESPONSES TO PHQ QUESTIONS 1-9: 0
SUM OF ALL RESPONSES TO PHQ QUESTIONS 1-9: 0
1. LITTLE INTEREST OR PLEASURE IN DOING THINGS: 0
SUM OF ALL RESPONSES TO PHQ9 QUESTIONS 1 & 2: 0

## 2022-10-20 ASSESSMENT — ENCOUNTER SYMPTOMS: SHORTNESS OF BREATH: 0

## 2022-10-20 NOTE — PROGRESS NOTES
Travis Guerrero M.D. Internal Medicine  [address] , [address]   Office : [phone]  Fax : [phone]    Chief Complaint   Patient presents with    Hypertension     No flu shot       History of Present Illness:  Mandie Brandon is a 38 y.o. male. HPI    Hypertension  Patient is in for Hypertension which is stable. Diet and Lifestyle: generally follows a low sodium diet  exercises sporadically  nonsmoker  Home BP Monitoring: is not measured at home . Patient's recent blood pressures were   BP Readings from Last 3 Encounters:   10/20/22 (!) 141/81   06/20/22 127/88   02/11/22 134/88   . taking medications as instructed, no medication side effects noted, no TIA's, no chest pain on exertion, no dyspnea on exertion, no swelling of ankles. Cardiac risk factors consist of dyslipidemia, hypertension, and male gender. Hyperlipidemia  Patient is in for follow-up for hyperlipidemia. Diet and Lifestyle: generally follows a low fat low cholesterol diet. Risk factors for vascular disease consist of hyperlipidemia and hypertension. Last LDL was   Lab Results   Component Value Date    LDLCALC 153 (H) 05/06/2021   . Last ALT was   Lab Results   Component Value Date/Time    ALT 30 05/06/2021 09:18 AM   .  No muscle aches. Hypothyroidism  He presents for follow up of  Hypothyroidism and reports  no new problems. He . He reports his symptoms are  stable. The patient currently is taking thyroid replacement. Lab Results   Component Value Date    TSH 2.790 11/05/2021   . Pre-diabetes? Unable to find A1c in records.   Will check today      Past Medical History:  Past Medical History:   Diagnosis Date    Acquired hypothyroidism     Allergic rhinitis, cause unspecified     Essential (primary) hypertension 1/18/2017    Hashimoto's thyroiditis x 13 yrs    Hypercholesterolemia     Morbidly obese ([facility] [address]     bmi=36     Past Surgical atraumatic. Eyes:      General: No scleral icterus. Conjunctiva/sclera: Conjunctivae normal.   Neck:      Vascular: No carotid bruit. Cardiovascular:      Rate and Rhythm: Normal rate and regular rhythm. Heart sounds: Normal heart sounds. No murmur heard. Pulmonary:      Effort: Pulmonary effort is normal.      Breath sounds: Normal breath sounds. Musculoskeletal:         General: No swelling. Skin:     Coloration: Skin is not jaundiced. Findings: No rash. Neurological:      General: No focal deficit present. Mental Status: He is alert. Mental status is at baseline. Cranial Nerves: No cranial nerve deficit. Motor: No weakness. Gait: Gait normal.   Psychiatric:         Mood and Affect: Mood normal.         Behavior: Behavior normal.         Thought Content: Thought content normal.         Judgment: Judgment normal.         Assessment/Plan:  Elroy Zepeda was seen today for hypertension. Diagnoses and all orders for this visit:    Impaired fasting glucose  -     metFORMIN (GLUCOPHAGE) 500 MG tablet; Take 1 tablet by mouth 2 times daily (with meals)  -     Hemoglobin A1C; Future    Essential (primary) hypertension  -     hydroCHLOROthiazide (HYDRODIURIL) 25 MG tablet; Take 1 tablet by mouth daily  -     losartan (COZAAR) 100 MG tablet; Take 1 tablet by mouth daily  -     Basic Metabolic Panel; Future  -     CBC with Auto Differential; Future    Acquired hypothyroidism  -     levothyroxine (SYNTHROID) 175 MCG tablet; Take 1 tablet by mouth every morning (before breakfast)    Allergic rhinitis, unspecified seasonality, unspecified trigger  -     montelukast (SINGULAIR) 10 MG tablet; Take 1 tablet by mouth daily    Screening for prostate cancer  -     PSA Screening; Future    Other hyperlipidemia  -     Lipid Panel; Future  -     Hepatic Function Panel; Future      Return in about 4 months (around 2/20/2023), or if symptoms worsen or fail to improve.   __  Carley Skelton, M.D.

## 2022-10-21 DIAGNOSIS — R73.03 PRE-DIABETES: ICD-10-CM

## 2022-10-21 LAB
EST. AVERAGE GLUCOSE BLD GHB EST-MCNC: 134 MG/DL
HBA1C MFR BLD: 6.3 % (ref 4.8–5.6)

## 2022-10-21 RX ORDER — ORAL SEMAGLUTIDE 7 MG/1
7 TABLET ORAL DAILY
Qty: 90 TABLET | Refills: 1 | Status: SHIPPED | OUTPATIENT
Start: 2022-10-21

## 2023-02-22 ENCOUNTER — OFFICE VISIT (OUTPATIENT)
Dept: INTERNAL MEDICINE CLINIC | Facility: CLINIC | Age: 43
End: 2023-02-22
Payer: COMMERCIAL

## 2023-02-22 VITALS
HEIGHT: 73 IN | OXYGEN SATURATION: 97 % | WEIGHT: 315 LBS | TEMPERATURE: 96.4 F | SYSTOLIC BLOOD PRESSURE: 147 MMHG | HEART RATE: 86 BPM | BODY MASS INDEX: 41.75 KG/M2 | DIASTOLIC BLOOD PRESSURE: 94 MMHG

## 2023-02-22 DIAGNOSIS — I10 ESSENTIAL (PRIMARY) HYPERTENSION: ICD-10-CM

## 2023-02-22 DIAGNOSIS — E03.9 ACQUIRED HYPOTHYROIDISM: ICD-10-CM

## 2023-02-22 DIAGNOSIS — J30.9 ALLERGIC RHINITIS, UNSPECIFIED SEASONALITY, UNSPECIFIED TRIGGER: ICD-10-CM

## 2023-02-22 DIAGNOSIS — R73.03 PRE-DIABETES: ICD-10-CM

## 2023-02-22 DIAGNOSIS — R68.82 DECREASED LIBIDO: Primary | ICD-10-CM

## 2023-02-22 LAB
ANION GAP SERPL CALC-SCNC: 4 MMOL/L (ref 2–11)
BUN SERPL-MCNC: 10 MG/DL (ref 6–23)
CALCIUM SERPL-MCNC: 9.5 MG/DL (ref 8.3–10.4)
CHLORIDE SERPL-SCNC: 103 MMOL/L (ref 101–110)
CO2 SERPL-SCNC: 26 MMOL/L (ref 21–32)
CREAT SERPL-MCNC: 1.1 MG/DL (ref 0.8–1.5)
GLUCOSE SERPL-MCNC: 139 MG/DL (ref 65–100)
POTASSIUM SERPL-SCNC: 3.6 MMOL/L (ref 3.5–5.1)
SODIUM SERPL-SCNC: 133 MMOL/L (ref 133–143)

## 2023-02-22 PROCEDURE — 3077F SYST BP >= 140 MM HG: CPT | Performed by: INTERNAL MEDICINE

## 2023-02-22 PROCEDURE — 99214 OFFICE O/P EST MOD 30 MIN: CPT | Performed by: INTERNAL MEDICINE

## 2023-02-22 PROCEDURE — 3080F DIAST BP >= 90 MM HG: CPT | Performed by: INTERNAL MEDICINE

## 2023-02-22 RX ORDER — HYDROCHLOROTHIAZIDE 25 MG/1
25 TABLET ORAL DAILY
Qty: 90 TABLET | Refills: 1 | Status: SHIPPED | OUTPATIENT
Start: 2023-02-22

## 2023-02-22 RX ORDER — LEVOTHYROXINE SODIUM 175 UG/1
175 TABLET ORAL
Qty: 90 TABLET | Refills: 1 | Status: SHIPPED | OUTPATIENT
Start: 2023-02-22

## 2023-02-22 RX ORDER — LOSARTAN POTASSIUM 100 MG/1
100 TABLET ORAL DAILY
Qty: 90 TABLET | Refills: 1 | Status: SHIPPED | OUTPATIENT
Start: 2023-02-22

## 2023-02-22 RX ORDER — MONTELUKAST SODIUM 10 MG/1
10 TABLET ORAL DAILY
Qty: 90 TABLET | Refills: 1 | Status: SHIPPED | OUTPATIENT
Start: 2023-02-22

## 2023-02-22 SDOH — ECONOMIC STABILITY: HOUSING INSECURITY
IN THE LAST 12 MONTHS, WAS THERE A TIME WHEN YOU DID NOT HAVE A STEADY PLACE TO SLEEP OR SLEPT IN A SHELTER (INCLUDING NOW)?: NO

## 2023-02-22 SDOH — ECONOMIC STABILITY: FOOD INSECURITY: WITHIN THE PAST 12 MONTHS, THE FOOD YOU BOUGHT JUST DIDN'T LAST AND YOU DIDN'T HAVE MONEY TO GET MORE.: SOMETIMES TRUE

## 2023-02-22 SDOH — ECONOMIC STABILITY: FOOD INSECURITY: WITHIN THE PAST 12 MONTHS, YOU WORRIED THAT YOUR FOOD WOULD RUN OUT BEFORE YOU GOT MONEY TO BUY MORE.: NEVER TRUE

## 2023-02-22 SDOH — ECONOMIC STABILITY: INCOME INSECURITY: HOW HARD IS IT FOR YOU TO PAY FOR THE VERY BASICS LIKE FOOD, HOUSING, MEDICAL CARE, AND HEATING?: NOT VERY HARD

## 2023-02-22 ASSESSMENT — ANXIETY QUESTIONNAIRES
GAD7 TOTAL SCORE: 0
6. BECOMING EASILY ANNOYED OR IRRITABLE: 0
1. FEELING NERVOUS, ANXIOUS, OR ON EDGE: 0
4. TROUBLE RELAXING: 0
2. NOT BEING ABLE TO STOP OR CONTROL WORRYING: 0
IF YOU CHECKED OFF ANY PROBLEMS ON THIS QUESTIONNAIRE, HOW DIFFICULT HAVE THESE PROBLEMS MADE IT FOR YOU TO DO YOUR WORK, TAKE CARE OF THINGS AT HOME, OR GET ALONG WITH OTHER PEOPLE: NOT DIFFICULT AT ALL
3. WORRYING TOO MUCH ABOUT DIFFERENT THINGS: 0
7. FEELING AFRAID AS IF SOMETHING AWFUL MIGHT HAPPEN: 0
5. BEING SO RESTLESS THAT IT IS HARD TO SIT STILL: 0

## 2023-02-22 ASSESSMENT — PATIENT HEALTH QUESTIONNAIRE - PHQ9
SUM OF ALL RESPONSES TO PHQ9 QUESTIONS 1 & 2: 0
SUM OF ALL RESPONSES TO PHQ QUESTIONS 1-9: 0
2. FEELING DOWN, DEPRESSED OR HOPELESS: 0
SUM OF ALL RESPONSES TO PHQ QUESTIONS 1-9: 0
1. LITTLE INTEREST OR PLEASURE IN DOING THINGS: 0

## 2023-02-22 ASSESSMENT — ENCOUNTER SYMPTOMS: SHORTNESS OF BREATH: 0

## 2023-02-22 NOTE — PROGRESS NOTES
Barbara Cyr M.D. Internal Medicine  [address] , [address]   Office : [phone]  Fax : [phone]    Chief Complaint   Patient presents with    Hypertension       History of Present Illness:  Karely Campbell is a 38 y.o. male. HPI    Hypertension  Patient is in for Hypertension which is stable. Diet and Lifestyle: generally follows a low sodium diet  exercises sporadically  nonsmoker  Home BP Monitoring: is not measured at home . Patient's recent blood pressures were   BP Readings from Last 3 Encounters:   02/22/23 (!) 147/94   10/20/22 (!) 141/81   06/20/22 127/88   . taking medications as instructed, no medication side effects noted, no TIA's, no chest pain on exertion, no dyspnea on exertion, no swelling of ankles. Cardiac risk factors consist of dyslipidemia, hypertension, male gender, and obesity (BMI >= 30 kg/m2). Lab Results   Component Value Date/Time     10/20/2022 08:58 AM    K 3.8 10/20/2022 08:58 AM     10/20/2022 08:58 AM    CO2 25 10/20/2022 08:58 AM    BUN 11 10/20/2022 08:58 AM    CREATININE 1.10 10/20/2022 08:58 AM    GLUCOSE 103 10/20/2022 08:58 AM    CALCIUM 9.7 10/20/2022 08:58 AM        Hyperlipidemia  Patient is in for follow-up for hyperlipidemia. Diet and Lifestyle: generally follows a low fat low cholesterol diet. Risk factors for vascular disease consist of hyperlipidemia and hypertension. Last LDL was   Lab Results   Component Value Date    LDLCALC 129.6 (H) 10/20/2022   . Last ALT was   Lab Results   Component Value Date/Time    ALT 58 10/20/2022 08:58 AM   .  No muscle aches. Hypothyroidism  He presents for follow up of  Hypothyroidism and reports  no new problems. Holly Delgado He reports his symptoms are  stable. The patient currently is taking thyroid replacement. Lab Results   Component Value Date    TSH 2.790 11/05/2021    HEI2OXQ 2.930 10/20/2022   .       Pre-diabetes  Could not tolerate metfromin but has been taking Rybelsus 7 mg    Hemoglobin A1C   Date Value Ref Range Status   10/20/2022 6.3 (H) 4.8 - 5.6 % Final         Past Medical History:  Past Medical History:   Diagnosis Date    Acquired hypothyroidism     Allergic rhinitis, cause unspecified     Essential (primary) hypertension 2017    Hashimoto's thyroiditis x 13 yrs    Hypercholesterolemia     Morbidly obese ([address]     bmi=34     Past Surgical History:  Past Surgical History:   Procedure Laterality Date    HEENT      jaw set--- no limitation per pt     Allergies:   No Known Allergies  Medications:   Current Outpatient Medications   Medication Sig Dispense Refill    hydroCHLOROthiazide (HYDRODIURIL) 25 MG tablet Take 1 tablet by mouth daily 90 tablet 1    levothyroxine (SYNTHROID) 175 MCG tablet Take 1 tablet by mouth every morning (before breakfast) 90 tablet 1    losartan (COZAAR) 100 MG tablet Take 1 tablet by mouth daily 90 tablet 1    montelukast (SINGULAIR) 10 MG tablet Take 1 tablet by mouth daily 90 tablet 1    Semaglutide 14 MG TABS Take 14 mg by mouth every morning (before breakfast) 90 tablet 1    metFORMIN (GLUCOPHAGE) 500 MG tablet Take 1 tablet by mouth 2 times daily (with meals) (Patient not taking: Reported on 2023) 60 tablet 1     No current facility-administered medications for this visit. Social History:  Social History     Tobacco Use    Smoking status: Never    Smokeless tobacco: Never   Substance Use Topics    Alcohol use: Yes     Alcohol/week: 6.0 - 7.0 standard drinks     Comment: twice a month     Family History  Family History   Problem Relation Age of Onset    Hypertension Mother     Diabetes Mother     No Known Problems Father        Review of Systems   Constitutional:  Positive for fatigue. Negative for chills and fever. Respiratory:  Negative for shortness of breath. Cardiovascular:  Negative for chest pain.    Genitourinary:          libido   Musculoskeletal:  Negative for gait problem. Skin:  Negative for rash. Neurological:  Negative for speech difficulty. Psychiatric/Behavioral:  Negative for dysphoric mood. Vital Signs  BP (!) 147/94 (Site: Right Lower Arm, Position: Sitting, Cuff Size: Large Adult)   Pulse 86   Temp (!) 96.4 °F (35.8 °C) (Temporal)   Ht 6' 1\" (1.854 m)   Wt (!) 346 lb 9.6 oz (157.2 kg)   SpO2 97%   BMI 45.73 kg/m²   Body mass index is 45.73 kg/m². Physical Exam  Vitals reviewed. Constitutional:       General: He is not in acute distress. Appearance: Normal appearance. He is obese. He is not ill-appearing. HENT:      Head: Normocephalic and atraumatic. Eyes:      General: No scleral icterus. Conjunctiva/sclera: Conjunctivae normal.   Cardiovascular:      Rate and Rhythm: Normal rate and regular rhythm. Heart sounds: Normal heart sounds. No murmur heard. Pulmonary:      Effort: Pulmonary effort is normal.      Breath sounds: Normal breath sounds. Musculoskeletal:         General: No swelling. Skin:     Coloration: Skin is not jaundiced. Findings: No rash. Neurological:      General: No focal deficit present. Mental Status: He is alert. Mental status is at baseline. Cranial Nerves: No cranial nerve deficit. Motor: No weakness. Gait: Gait normal.   Psychiatric:         Mood and Affect: Mood normal.         Behavior: Behavior normal.         Thought Content: Thought content normal.         Judgment: Judgment normal.         Assessment/Plan:  Elroy Zepeda was seen today for hypertension. Diagnoses and all orders for this visit:    Decreased libido  -     Testosterone Total Only, Male; Future  -     Testosterone Total Only, Male    Essential (primary) hypertension  -     hydroCHLOROthiazide (HYDRODIURIL) 25 MG tablet; Take 1 tablet by mouth daily  -     losartan (COZAAR) 100 MG tablet; Take 1 tablet by mouth daily  -     Basic Metabolic Panel;  Future  -     Basic Metabolic Panel    Acquired hypothyroidism  -     levothyroxine (SYNTHROID) 175 MCG tablet; Take 1 tablet by mouth every morning (before breakfast)    Allergic rhinitis, unspecified seasonality, unspecified trigger  -     montelukast (SINGULAIR) 10 MG tablet; Take 1 tablet by mouth daily    Pre-diabetes  -     Hemoglobin A1C; Future  -     Semaglutide 14 MG TABS; Take 14 mg by mouth every morning (before breakfast)  -     Hemoglobin A1C    Body mass index (BMI) 45.0-49.9, adult (HCC)  -     Semaglutide 14 MG TABS; Take 14 mg by mouth every morning (before breakfast)      Return in about 4 months (around 6/22/2023), or if symptoms worsen or fail to improve.   __  Nida Pollock M.D.

## 2023-02-23 ENCOUNTER — TELEPHONE (OUTPATIENT)
Dept: INTERNAL MEDICINE CLINIC | Facility: CLINIC | Age: 43
End: 2023-02-23

## 2023-02-23 DIAGNOSIS — R79.89 LOW TESTOSTERONE IN MALE: Primary | ICD-10-CM

## 2023-02-23 LAB
EST. AVERAGE GLUCOSE BLD GHB EST-MCNC: 126 MG/DL
HBA1C MFR BLD: 6 % (ref 4.8–5.6)
TESTOST SERPL-MCNC: 196 NG/DL (ref 264–916)

## 2023-02-23 NOTE — TELEPHONE ENCOUNTER
----- Message from Amanda Degroot MD sent at 2/23/2023  8:40 AM EST -----  I have reviewed all lab results which are normal or stable except testosterone is low. Recommend reepeat early morning testosterone to confirm. Please inform the patient.

## 2023-02-27 ENCOUNTER — NURSE ONLY (OUTPATIENT)
Dept: INTERNAL MEDICINE CLINIC | Facility: CLINIC | Age: 43
End: 2023-02-27

## 2023-02-27 DIAGNOSIS — R79.89 LOW TESTOSTERONE IN MALE: ICD-10-CM

## 2023-03-01 LAB — TESTOST SERPL-MCNC: 303 NG/DL (ref 264–916)

## 2023-04-20 DIAGNOSIS — E03.9 ACQUIRED HYPOTHYROIDISM: ICD-10-CM

## 2023-04-20 DIAGNOSIS — I10 ESSENTIAL (PRIMARY) HYPERTENSION: ICD-10-CM

## 2023-04-21 RX ORDER — LEVOTHYROXINE SODIUM 175 UG/1
TABLET ORAL
Qty: 90 TABLET | Refills: 0 | OUTPATIENT
Start: 2023-04-21

## 2023-04-21 RX ORDER — HYDROCHLOROTHIAZIDE 25 MG/1
TABLET ORAL
Qty: 90 TABLET | Refills: 0 | OUTPATIENT
Start: 2023-04-21

## 2023-04-21 RX ORDER — LOSARTAN POTASSIUM 100 MG/1
TABLET ORAL
Qty: 90 TABLET | Refills: 0 | OUTPATIENT
Start: 2023-04-21

## 2023-06-05 ENCOUNTER — TELEPHONE (OUTPATIENT)
Dept: INTERNAL MEDICINE CLINIC | Facility: CLINIC | Age: 43
End: 2023-06-05

## 2023-06-05 NOTE — TELEPHONE ENCOUNTER
A PA was done Pt Rybelsus through cover my meds there is a pending review that we will be notified of the status between 48 to 72 hours please refer to key number [id number] if need be

## 2023-06-22 ENCOUNTER — OFFICE VISIT (OUTPATIENT)
Dept: INTERNAL MEDICINE CLINIC | Facility: CLINIC | Age: 43
End: 2023-06-22
Payer: COMMERCIAL

## 2023-06-22 VITALS
SYSTOLIC BLOOD PRESSURE: 137 MMHG | DIASTOLIC BLOOD PRESSURE: 86 MMHG | BODY MASS INDEX: 40.43 KG/M2 | WEIGHT: 315 LBS | HEIGHT: 74 IN | OXYGEN SATURATION: 96 % | TEMPERATURE: 97 F | HEART RATE: 87 BPM

## 2023-06-22 DIAGNOSIS — I10 ESSENTIAL (PRIMARY) HYPERTENSION: ICD-10-CM

## 2023-06-22 DIAGNOSIS — R73.03 PRE-DIABETES: ICD-10-CM

## 2023-06-22 DIAGNOSIS — E03.9 ACQUIRED HYPOTHYROIDISM: ICD-10-CM

## 2023-06-22 DIAGNOSIS — J30.9 ALLERGIC RHINITIS, UNSPECIFIED SEASONALITY, UNSPECIFIED TRIGGER: ICD-10-CM

## 2023-06-22 PROCEDURE — 3075F SYST BP GE 130 - 139MM HG: CPT | Performed by: INTERNAL MEDICINE

## 2023-06-22 PROCEDURE — 3079F DIAST BP 80-89 MM HG: CPT | Performed by: INTERNAL MEDICINE

## 2023-06-22 PROCEDURE — 99214 OFFICE O/P EST MOD 30 MIN: CPT | Performed by: INTERNAL MEDICINE

## 2023-06-22 RX ORDER — HYDROCHLOROTHIAZIDE 25 MG/1
25 TABLET ORAL DAILY
Qty: 90 TABLET | Refills: 1 | Status: SHIPPED | OUTPATIENT
Start: 2023-06-22

## 2023-06-22 RX ORDER — LEVOTHYROXINE SODIUM 175 UG/1
175 TABLET ORAL
Qty: 90 TABLET | Refills: 1 | Status: SHIPPED | OUTPATIENT
Start: 2023-06-22

## 2023-06-22 RX ORDER — MONTELUKAST SODIUM 10 MG/1
10 TABLET ORAL DAILY
Qty: 90 TABLET | Refills: 1 | Status: SHIPPED | OUTPATIENT
Start: 2023-06-22

## 2023-06-22 RX ORDER — LOSARTAN POTASSIUM 100 MG/1
100 TABLET ORAL DAILY
Qty: 90 TABLET | Refills: 1 | Status: SHIPPED | OUTPATIENT
Start: 2023-06-22

## 2023-06-22 ASSESSMENT — ENCOUNTER SYMPTOMS: NAUSEA: 0

## 2023-06-22 NOTE — PROGRESS NOTES
nausea. Musculoskeletal:  Negative for gait problem. Skin:  Negative for rash. Neurological:  Negative for speech difficulty. Psychiatric/Behavioral:  Negative for dysphoric mood. Vital Signs  /86 (Site: Right Lower Arm)   Pulse 87   Temp 97 °F (36.1 °C) (Temporal)   Ht 6' 2\" (1.88 m) Comment: pt reports  Wt (!) 331 lb 3.2 oz (150.2 kg)   SpO2 96%   BMI 42.52 kg/m²   Body mass index is 42.52 kg/m². Physical Exam  Vitals reviewed. Constitutional:       General: He is not in acute distress. Appearance: Normal appearance. He is obese. He is not ill-appearing. HENT:      Head: Normocephalic and atraumatic. Eyes:      General: No scleral icterus. Conjunctiva/sclera: Conjunctivae normal.   Neck:      Vascular: No carotid bruit. Cardiovascular:      Rate and Rhythm: Normal rate and regular rhythm. Heart sounds: Normal heart sounds. No murmur heard. Pulmonary:      Effort: Pulmonary effort is normal.      Breath sounds: Normal breath sounds. Musculoskeletal:         General: No swelling. Skin:     Coloration: Skin is not jaundiced. Findings: No rash. Neurological:      General: No focal deficit present. Mental Status: He is alert. Mental status is at baseline. Cranial Nerves: No cranial nerve deficit. Motor: No weakness. Gait: Gait normal.   Psychiatric:         Mood and Affect: Mood normal.         Behavior: Behavior normal.         Thought Content: Thought content normal.         Judgment: Judgment normal.         Assessment/Plan:  Kenneth Navarro was seen today for hypertension.     Diagnoses and all orders for this visit:    Pre-diabetes  -     Semaglutide 14 MG TABS; Take 14 mg by mouth every morning (before breakfast)    Body mass index (BMI) 45.0-49.9, adult (HCC)  -     Semaglutide 14 MG TABS; Take 14 mg by mouth every morning (before breakfast)    Allergic rhinitis, unspecified seasonality, unspecified trigger  -     montelukast (SINGULAIR)

## 2023-08-08 ENCOUNTER — OFFICE VISIT (OUTPATIENT)
Dept: INTERNAL MEDICINE CLINIC | Facility: CLINIC | Age: 43
End: 2023-08-08
Payer: COMMERCIAL

## 2023-08-08 VITALS
BODY MASS INDEX: 40.43 KG/M2 | OXYGEN SATURATION: 97 % | WEIGHT: 315 LBS | TEMPERATURE: 97 F | HEART RATE: 96 BPM | SYSTOLIC BLOOD PRESSURE: 137 MMHG | HEIGHT: 74 IN | DIASTOLIC BLOOD PRESSURE: 82 MMHG

## 2023-08-08 DIAGNOSIS — Z13.6 SCREENING FOR HEART DISEASE: ICD-10-CM

## 2023-08-08 DIAGNOSIS — J01.80 OTHER ACUTE SINUSITIS, RECURRENCE NOT SPECIFIED: Primary | ICD-10-CM

## 2023-08-08 PROCEDURE — 3079F DIAST BP 80-89 MM HG: CPT | Performed by: INTERNAL MEDICINE

## 2023-08-08 PROCEDURE — 3075F SYST BP GE 130 - 139MM HG: CPT | Performed by: INTERNAL MEDICINE

## 2023-08-08 PROCEDURE — 99213 OFFICE O/P EST LOW 20 MIN: CPT | Performed by: INTERNAL MEDICINE

## 2023-08-08 RX ORDER — AZITHROMYCIN 250 MG/1
250 TABLET, FILM COATED ORAL SEE ADMIN INSTRUCTIONS
Qty: 6 TABLET | Refills: 0 | Status: SHIPPED | OUTPATIENT
Start: 2023-08-08 | End: 2023-08-13

## 2023-08-08 SDOH — ECONOMIC STABILITY: FOOD INSECURITY: WITHIN THE PAST 12 MONTHS, YOU WORRIED THAT YOUR FOOD WOULD RUN OUT BEFORE YOU GOT MONEY TO BUY MORE.: NEVER TRUE

## 2023-08-08 SDOH — ECONOMIC STABILITY: FOOD INSECURITY: WITHIN THE PAST 12 MONTHS, THE FOOD YOU BOUGHT JUST DIDN'T LAST AND YOU DIDN'T HAVE MONEY TO GET MORE.: NEVER TRUE

## 2023-08-08 SDOH — ECONOMIC STABILITY: INCOME INSECURITY: HOW HARD IS IT FOR YOU TO PAY FOR THE VERY BASICS LIKE FOOD, HOUSING, MEDICAL CARE, AND HEATING?: NOT HARD AT ALL

## 2023-08-08 ASSESSMENT — PATIENT HEALTH QUESTIONNAIRE - PHQ9
SUM OF ALL RESPONSES TO PHQ QUESTIONS 1-9: 0
SUM OF ALL RESPONSES TO PHQ9 QUESTIONS 1 & 2: 0
1. LITTLE INTEREST OR PLEASURE IN DOING THINGS: 0
SUM OF ALL RESPONSES TO PHQ QUESTIONS 1-9: 0
2. FEELING DOWN, DEPRESSED OR HOPELESS: 0

## 2023-08-08 ASSESSMENT — ANXIETY QUESTIONNAIRES
5. BEING SO RESTLESS THAT IT IS HARD TO SIT STILL: 0
4. TROUBLE RELAXING: 0
7. FEELING AFRAID AS IF SOMETHING AWFUL MIGHT HAPPEN: 0
2. NOT BEING ABLE TO STOP OR CONTROL WORRYING: 0
IF YOU CHECKED OFF ANY PROBLEMS ON THIS QUESTIONNAIRE, HOW DIFFICULT HAVE THESE PROBLEMS MADE IT FOR YOU TO DO YOUR WORK, TAKE CARE OF THINGS AT HOME, OR GET ALONG WITH OTHER PEOPLE: NOT DIFFICULT AT ALL
1. FEELING NERVOUS, ANXIOUS, OR ON EDGE: 0
3. WORRYING TOO MUCH ABOUT DIFFERENT THINGS: 0

## 2023-08-08 ASSESSMENT — ENCOUNTER SYMPTOMS
SORE THROAT: 1
COUGH: 1

## 2023-08-08 NOTE — PROGRESS NOTES
Eli Salas M.D. Internal Medicine  [address]  Office : [phone]  Fax : [phone]    Chief Complaint   Patient presents with    Sinusitis     Sore throat, sinus drainage , cough  x1 week        History of Present Illness:  Cierra Conley is a 38 y.o. male. Sinusitis  This is a new problem. The current episode started in the past 7 days. The problem is unchanged. There has been no fever. Associated symptoms include congestion, coughing and a sore throat. Pertinent negatives include no chills, diaphoresis, ear pain or headaches. Past treatments include oral decongestants. The treatment provided no relief.          Past Medical History:  Past Medical History:   Diagnosis Date    Acquired hypothyroidism     Allergic rhinitis, cause unspecified     Essential (primary) hypertension 1/18/2017    Hashimoto's thyroiditis x 13 yrs    Hypercholesterolemia     Morbidly obese ([address])     bmi=36     Past Surgical History:  Past Surgical History:   Procedure Laterality Date    HEENT  1995    jaw set--- no limitation per pt     Allergies:   No Known Allergies  Medications:   Current Outpatient Medications   Medication Sig Dispense Refill    azithromycin (ZITHROMAX) 250 MG tablet Take 1 tablet by mouth See Admin Instructions for 5 days 500mg on day 1 followed by 250mg on days 2 - 5 6 tablet 0    Semaglutide 14 MG TABS Take 14 mg by mouth every morning (before breakfast) 90 tablet 1    montelukast (SINGULAIR) 10 MG tablet Take 1 tablet by mouth daily 90 tablet 1    losartan (COZAAR) 100 MG tablet Take 1 tablet by mouth daily 90 tablet 1    levothyroxine (SYNTHROID) 175 MCG tablet Take 1 tablet by mouth every morning (before breakfast) 90 tablet 1    hydroCHLOROthiazide (HYDRODIURIL) 25 MG tablet Take 1 tablet by mouth daily 90 tablet 1    metFORMIN (GLUCOPHAGE) 500 MG tablet Take 1 tablet by mouth 2 times daily (with meals) (Patient

## 2023-08-24 DIAGNOSIS — Z13.6 SCREENING FOR HEART DISEASE: ICD-10-CM

## 2023-10-23 ENCOUNTER — OFFICE VISIT (OUTPATIENT)
Dept: INTERNAL MEDICINE CLINIC | Facility: CLINIC | Age: 43
End: 2023-10-23
Payer: COMMERCIAL

## 2023-10-23 VITALS
TEMPERATURE: 97.2 F | SYSTOLIC BLOOD PRESSURE: 133 MMHG | DIASTOLIC BLOOD PRESSURE: 77 MMHG | OXYGEN SATURATION: 97 % | HEART RATE: 64 BPM | BODY MASS INDEX: 40.43 KG/M2 | HEIGHT: 74 IN | WEIGHT: 315 LBS

## 2023-10-23 DIAGNOSIS — E03.9 ACQUIRED HYPOTHYROIDISM: ICD-10-CM

## 2023-10-23 DIAGNOSIS — E78.49 OTHER HYPERLIPIDEMIA: ICD-10-CM

## 2023-10-23 DIAGNOSIS — I10 ESSENTIAL (PRIMARY) HYPERTENSION: Primary | ICD-10-CM

## 2023-10-23 DIAGNOSIS — R73.03 PRE-DIABETES: ICD-10-CM

## 2023-10-23 DIAGNOSIS — Z23 NEED FOR PROPHYLACTIC VACCINATION AND INOCULATION AGAINST CHOLERA ALONE: ICD-10-CM

## 2023-10-23 DIAGNOSIS — Z12.5 SCREENING FOR PROSTATE CANCER: ICD-10-CM

## 2023-10-23 LAB
ALBUMIN SERPL-MCNC: 3.6 G/DL (ref 3.5–5)
ALBUMIN/GLOB SERPL: 1.1 (ref 0.4–1.6)
ALP SERPL-CCNC: 59 U/L (ref 50–136)
ALT SERPL-CCNC: 38 U/L (ref 12–65)
ANION GAP SERPL CALC-SCNC: 7 MMOL/L (ref 2–11)
AST SERPL-CCNC: 26 U/L (ref 15–37)
BASOPHILS # BLD: 0 K/UL (ref 0–0.2)
BASOPHILS NFR BLD: 1 % (ref 0–2)
BILIRUB DIRECT SERPL-MCNC: 0.1 MG/DL
BILIRUB SERPL-MCNC: 0.5 MG/DL (ref 0.2–1.1)
BUN SERPL-MCNC: 10 MG/DL (ref 6–23)
CALCIUM SERPL-MCNC: 8.9 MG/DL (ref 8.3–10.4)
CHLORIDE SERPL-SCNC: 108 MMOL/L (ref 101–110)
CHOLEST SERPL-MCNC: 184 MG/DL
CO2 SERPL-SCNC: 27 MMOL/L (ref 21–32)
CREAT SERPL-MCNC: 1.1 MG/DL (ref 0.8–1.5)
DIFFERENTIAL METHOD BLD: ABNORMAL
EOSINOPHIL # BLD: 0.2 K/UL (ref 0–0.8)
EOSINOPHIL NFR BLD: 3 % (ref 0.5–7.8)
ERYTHROCYTE [DISTWIDTH] IN BLOOD BY AUTOMATED COUNT: 12.9 % (ref 11.9–14.6)
EST. AVERAGE GLUCOSE BLD GHB EST-MCNC: 123 MG/DL
GLOBULIN SER CALC-MCNC: 3.3 G/DL (ref 2.8–4.5)
GLUCOSE SERPL-MCNC: 97 MG/DL (ref 65–100)
HBA1C MFR BLD: 5.9 % (ref 4.8–5.6)
HCT VFR BLD AUTO: 50.5 % (ref 41.1–50.3)
HDLC SERPL-MCNC: 32 MG/DL (ref 40–60)
HDLC SERPL: 5.8
HGB BLD-MCNC: 17 G/DL (ref 13.6–17.2)
IMM GRANULOCYTES # BLD AUTO: 0 K/UL (ref 0–0.5)
IMM GRANULOCYTES NFR BLD AUTO: 1 % (ref 0–5)
LDLC SERPL CALC-MCNC: 125 MG/DL
LYMPHOCYTES # BLD: 1.3 K/UL (ref 0.5–4.6)
LYMPHOCYTES NFR BLD: 24 % (ref 13–44)
MCH RBC QN AUTO: 29.7 PG (ref 26.1–32.9)
MCHC RBC AUTO-ENTMCNC: 33.7 G/DL (ref 31.4–35)
MCV RBC AUTO: 88.3 FL (ref 82–102)
MONOCYTES # BLD: 0.5 K/UL (ref 0.1–1.3)
MONOCYTES NFR BLD: 9 % (ref 4–12)
NEUTS SEG # BLD: 3.4 K/UL (ref 1.7–8.2)
NEUTS SEG NFR BLD: 62 % (ref 43–78)
NRBC # BLD: 0 K/UL (ref 0–0.2)
PLATELET # BLD AUTO: 278 K/UL (ref 150–450)
PMV BLD AUTO: 9.4 FL (ref 9.4–12.3)
POTASSIUM SERPL-SCNC: 3.6 MMOL/L (ref 3.5–5.1)
PROT SERPL-MCNC: 6.9 G/DL (ref 6.3–8.2)
PSA SERPL-MCNC: 1.8 NG/ML
RBC # BLD AUTO: 5.72 M/UL (ref 4.23–5.6)
SODIUM SERPL-SCNC: 142 MMOL/L (ref 133–143)
TRIGL SERPL-MCNC: 135 MG/DL (ref 35–150)
TSH, 3RD GENERATION: 0.96 UIU/ML (ref 0.36–3.74)
VLDLC SERPL CALC-MCNC: 27 MG/DL (ref 6–23)
WBC # BLD AUTO: 5.4 K/UL (ref 4.3–11.1)

## 2023-10-23 PROCEDURE — 90674 CCIIV4 VAC NO PRSV 0.5 ML IM: CPT | Performed by: INTERNAL MEDICINE

## 2023-10-23 PROCEDURE — 90471 IMMUNIZATION ADMIN: CPT | Performed by: INTERNAL MEDICINE

## 2023-10-23 PROCEDURE — 3075F SYST BP GE 130 - 139MM HG: CPT | Performed by: INTERNAL MEDICINE

## 2023-10-23 PROCEDURE — 3078F DIAST BP <80 MM HG: CPT | Performed by: INTERNAL MEDICINE

## 2023-10-23 PROCEDURE — 99214 OFFICE O/P EST MOD 30 MIN: CPT | Performed by: INTERNAL MEDICINE

## 2023-10-23 RX ORDER — LOSARTAN POTASSIUM 100 MG/1
100 TABLET ORAL DAILY
Qty: 90 TABLET | Refills: 1 | Status: SHIPPED | OUTPATIENT
Start: 2023-10-23

## 2023-10-23 RX ORDER — LEVOTHYROXINE SODIUM 175 UG/1
175 TABLET ORAL
Qty: 90 TABLET | Refills: 1 | Status: SHIPPED | OUTPATIENT
Start: 2023-10-23

## 2023-10-23 RX ORDER — HYDROCHLOROTHIAZIDE 25 MG/1
25 TABLET ORAL DAILY
Qty: 90 TABLET | Refills: 1 | Status: SHIPPED | OUTPATIENT
Start: 2023-10-23

## 2023-10-23 SDOH — ECONOMIC STABILITY: FOOD INSECURITY: WITHIN THE PAST 12 MONTHS, YOU WORRIED THAT YOUR FOOD WOULD RUN OUT BEFORE YOU GOT MONEY TO BUY MORE.: NEVER TRUE

## 2023-10-23 SDOH — ECONOMIC STABILITY: FOOD INSECURITY: WITHIN THE PAST 12 MONTHS, THE FOOD YOU BOUGHT JUST DIDN'T LAST AND YOU DIDN'T HAVE MONEY TO GET MORE.: NEVER TRUE

## 2023-10-23 SDOH — ECONOMIC STABILITY: INCOME INSECURITY: HOW HARD IS IT FOR YOU TO PAY FOR THE VERY BASICS LIKE FOOD, HOUSING, MEDICAL CARE, AND HEATING?: NOT HARD AT ALL

## 2023-10-23 ASSESSMENT — ANXIETY QUESTIONNAIRES
4. TROUBLE RELAXING: 0
7. FEELING AFRAID AS IF SOMETHING AWFUL MIGHT HAPPEN: 0
IF YOU CHECKED OFF ANY PROBLEMS ON THIS QUESTIONNAIRE, HOW DIFFICULT HAVE THESE PROBLEMS MADE IT FOR YOU TO DO YOUR WORK, TAKE CARE OF THINGS AT HOME, OR GET ALONG WITH OTHER PEOPLE: NOT DIFFICULT AT ALL
2. NOT BEING ABLE TO STOP OR CONTROL WORRYING: 0
5. BEING SO RESTLESS THAT IT IS HARD TO SIT STILL: 0
6. BECOMING EASILY ANNOYED OR IRRITABLE: 0
3. WORRYING TOO MUCH ABOUT DIFFERENT THINGS: 0
1. FEELING NERVOUS, ANXIOUS, OR ON EDGE: 0
GAD7 TOTAL SCORE: 0

## 2023-10-23 ASSESSMENT — PATIENT HEALTH QUESTIONNAIRE - PHQ9
SUM OF ALL RESPONSES TO PHQ QUESTIONS 1-9: 0
SUM OF ALL RESPONSES TO PHQ9 QUESTIONS 1 & 2: 0
SUM OF ALL RESPONSES TO PHQ QUESTIONS 1-9: 0
1. LITTLE INTEREST OR PLEASURE IN DOING THINGS: 0
SUM OF ALL RESPONSES TO PHQ QUESTIONS 1-9: 0
SUM OF ALL RESPONSES TO PHQ QUESTIONS 1-9: 0
2. FEELING DOWN, DEPRESSED OR HOPELESS: 0

## 2023-10-23 NOTE — PROGRESS NOTES
Annie Rice M.D. Internal Medicine  [address] [facility], [address] [place] [address]  Office : [phone]  Fax : [phone]    Chief Complaint   Patient presents with    Hypertension       History of Present Illness:  Alba Marino is a 37 y.o. male. HPI    Hypertension  Patient is in for Hypertension which is stable. Diet and Lifestyle: generally follows a low sodium diet  exercises sporadically  nonsmoker  Home BP Monitoring: is not measured at home . Patient's recent blood pressures were   BP Readings from Last 3 Encounters:   10/23/23 133/77   08/08/23 137/82   06/22/23 137/86   . taking medications as instructed, no medication side effects noted, no TIA's, no chest pain on exertion, no dyspnea on exertion, no swelling of ankles. Cardiac risk factors consist of dyslipidemia, hypertension, and male gender. Lab Results   Component Value Date/Time     02/22/2023 08:30 AM    K 3.6 02/22/2023 08:30 AM     02/22/2023 08:30 AM    CO2 26 02/22/2023 08:30 AM    BUN 10 02/22/2023 08:30 AM    CREATININE 1.10 02/22/2023 08:30 AM    GLUCOSE 139 02/22/2023 08:30 AM    CALCIUM 9.5 02/22/2023 08:30 AM    LABGLOM >60 02/22/2023 08:30 AM        Hyperlipidemia  Patient is in for follow-up for hyperlipidemia. Diet and Lifestyle: generally follows a low fat low cholesterol diet. Risk factors for vascular disease consist of hyperlipidemia and hypertension. Last LDL was   Lab Results   Component Value Date    LDLCALC 129.6 (H) 10/20/2022   . Last ALT was   Lab Results   Component Value Date/Time    ALT 58 10/20/2022 08:58 AM   .  No muscle aches. Hypothyroidism  He presents for follow up of  hypothyroidism and reports  no new problems. Eitan Lyons He reports his symptoms are  stable. The patient currently is taking thyroid replacement. Lab Results   Component Value Date    TSH 2.790 11/05/2021    PKG3OAB 2.930 10/20/2022   .       Pre-diabetes  Could not

## 2023-11-29 ENCOUNTER — OFFICE VISIT (OUTPATIENT)
Dept: INTERNAL MEDICINE CLINIC | Facility: CLINIC | Age: 43
End: 2023-11-29
Payer: COMMERCIAL

## 2023-11-29 DIAGNOSIS — J01.90 ACUTE SINUSITIS, RECURRENCE NOT SPECIFIED, UNSPECIFIED LOCATION: Primary | ICD-10-CM

## 2023-11-29 PROCEDURE — 99213 OFFICE O/P EST LOW 20 MIN: CPT | Performed by: INTERNAL MEDICINE

## 2023-11-29 RX ORDER — AZITHROMYCIN 250 MG/1
250 TABLET, FILM COATED ORAL SEE ADMIN INSTRUCTIONS
Qty: 6 TABLET | Refills: 0 | Status: SHIPPED | OUTPATIENT
Start: 2023-11-29 | End: 2023-12-04

## 2023-11-29 ASSESSMENT — ENCOUNTER SYMPTOMS
SINUS PAIN: 0
SORE THROAT: 0
RHINORRHEA: 1
SHORTNESS OF BREATH: 0
COUGH: 0

## 2023-11-29 NOTE — PROGRESS NOTES
at baseline. Cranial Nerves: No cranial nerve deficit. Motor: No weakness. Gait: Gait normal.   Psychiatric:         Mood and Affect: Mood normal.         Behavior: Behavior normal.         Thought Content: Thought content normal.         Judgment: Judgment normal.           Assessment/Plan:  Kaitlin Solis was seen today for nasal congestion. Diagnoses and all orders for this visit:    Acute sinusitis, recurrence not specified, unspecified location  -     azithromycin (ZITHROMAX) 250 MG tablet; Take 1 tablet by mouth See Admin Instructions for 5 days 500mg on day 1 followed by 250mg on days 2 - 5        Return if symptoms worsen or fail to improve.   __  Marina Mesa M.D.

## 2024-01-30 ENCOUNTER — OFFICE VISIT (OUTPATIENT)
Dept: INTERNAL MEDICINE CLINIC | Facility: CLINIC | Age: 44
End: 2024-01-30
Payer: COMMERCIAL

## 2024-01-30 VITALS
OXYGEN SATURATION: 99 % | HEIGHT: 74 IN | TEMPERATURE: 97.3 F | WEIGHT: 315 LBS | HEART RATE: 75 BPM | BODY MASS INDEX: 40.43 KG/M2 | SYSTOLIC BLOOD PRESSURE: 132 MMHG | DIASTOLIC BLOOD PRESSURE: 81 MMHG

## 2024-01-30 DIAGNOSIS — M54.2 NECK PAIN ON RIGHT SIDE: Primary | ICD-10-CM

## 2024-01-30 DIAGNOSIS — R20.2 PARESTHESIA OF RIGHT ARM: ICD-10-CM

## 2024-01-30 PROCEDURE — 3075F SYST BP GE 130 - 139MM HG: CPT | Performed by: INTERNAL MEDICINE

## 2024-01-30 PROCEDURE — 99213 OFFICE O/P EST LOW 20 MIN: CPT | Performed by: INTERNAL MEDICINE

## 2024-01-30 PROCEDURE — 3079F DIAST BP 80-89 MM HG: CPT | Performed by: INTERNAL MEDICINE

## 2024-01-30 RX ORDER — BACLOFEN 10 MG/1
10 TABLET ORAL 3 TIMES DAILY
Qty: 30 TABLET | Refills: 0 | Status: SHIPPED | OUTPATIENT
Start: 2024-01-30

## 2024-01-30 RX ORDER — MELOXICAM 7.5 MG/1
7.5 TABLET ORAL DAILY
Qty: 14 TABLET | Refills: 0 | Status: SHIPPED | OUTPATIENT
Start: 2024-01-30

## 2024-01-30 SDOH — ECONOMIC STABILITY: FOOD INSECURITY: WITHIN THE PAST 12 MONTHS, THE FOOD YOU BOUGHT JUST DIDN'T LAST AND YOU DIDN'T HAVE MONEY TO GET MORE.: NEVER TRUE

## 2024-01-30 SDOH — ECONOMIC STABILITY: FOOD INSECURITY: WITHIN THE PAST 12 MONTHS, YOU WORRIED THAT YOUR FOOD WOULD RUN OUT BEFORE YOU GOT MONEY TO BUY MORE.: NEVER TRUE

## 2024-01-30 SDOH — ECONOMIC STABILITY: INCOME INSECURITY: HOW HARD IS IT FOR YOU TO PAY FOR THE VERY BASICS LIKE FOOD, HOUSING, MEDICAL CARE, AND HEATING?: NOT HARD AT ALL

## 2024-01-30 ASSESSMENT — PATIENT HEALTH QUESTIONNAIRE - PHQ9
2. FEELING DOWN, DEPRESSED OR HOPELESS: 0
SUM OF ALL RESPONSES TO PHQ QUESTIONS 1-9: 0
SUM OF ALL RESPONSES TO PHQ QUESTIONS 1-9: 0
1. LITTLE INTEREST OR PLEASURE IN DOING THINGS: 0
SUM OF ALL RESPONSES TO PHQ QUESTIONS 1-9: 0
SUM OF ALL RESPONSES TO PHQ9 QUESTIONS 1 & 2: 0
SUM OF ALL RESPONSES TO PHQ QUESTIONS 1-9: 0

## 2024-01-30 ASSESSMENT — ANXIETY QUESTIONNAIRES
1. FEELING NERVOUS, ANXIOUS, OR ON EDGE: 0
5. BEING SO RESTLESS THAT IT IS HARD TO SIT STILL: 0
GAD7 TOTAL SCORE: 0
2. NOT BEING ABLE TO STOP OR CONTROL WORRYING: 0
6. BECOMING EASILY ANNOYED OR IRRITABLE: 0
4. TROUBLE RELAXING: 0
IF YOU CHECKED OFF ANY PROBLEMS ON THIS QUESTIONNAIRE, HOW DIFFICULT HAVE THESE PROBLEMS MADE IT FOR YOU TO DO YOUR WORK, TAKE CARE OF THINGS AT HOME, OR GET ALONG WITH OTHER PEOPLE: NOT DIFFICULT AT ALL
3. WORRYING TOO MUCH ABOUT DIFFERENT THINGS: 0
7. FEELING AFRAID AS IF SOMETHING AWFUL MIGHT HAPPEN: 0

## 2024-01-30 NOTE — PROGRESS NOTES
[facility]  Wayne Raymond M.D.  Internal Medicine  [address] [place] [address]  Office : [phone]  Fax : [phone]    Chief Complaint   Patient presents with   • Muscle Pain     Possible pulled muscle in back/ back pain for the past month consistently        History of Present Illness:  Mushtaq Echevarria is a 43 y.o. male.  Neck Pain   This is a new problem. The current episode started 1 to 4 weeks ago. The problem occurs constantly. The problem has been unchanged. Associated with: lifting free weight. The pain is present in the right side. The quality of the pain is described as shooting and aching. The pain is moderate. The symptoms are aggravated by position. The pain is Same all the time. Pertinent negatives include no fever, numbness, paresis or weakness. He has tried heat (message) for the symptoms. The treatment provided mild relief.   Has pain down right arm        Past Medical History:  Past Medical History:   Diagnosis Date   • Acquired hypothyroidism    • Allergic rhinitis, cause unspecified    • Essential (primary) hypertension 1/18/2017   • Hashimoto's thyroiditis x 13 yrs   • Hypercholesterolemia    • Morbidly obese (HCC)     bmi=36     Past Surgical History:  Past Surgical History:   Procedure Laterality Date   • HEENT  1995    jaw set--- no limitation per pt     Allergies:   No Known Allergies  Medications:   Current Outpatient Medications   Medication Sig Dispense Refill   • meloxicam (MOBIC) 7.5 MG tablet Take 1 tablet by mouth daily 14 tablet 0   • baclofen (LIORESAL) 10 MG tablet Take 1 tablet by mouth 3 times daily 30 tablet 0   • losartan (COZAAR) 100 MG tablet Take 1 tablet by mouth daily 90 tablet 1   • Semaglutide 14 MG TABS Take 14 mg by mouth every morning (before breakfast) 90 tablet 1   • levothyroxine (SYNTHROID) 175 MCG tablet Take 1 tablet by mouth every morning (before breakfast) 90 tablet 1   • hydroCHLOROthiazide

## 2024-02-27 ENCOUNTER — OFFICE VISIT (OUTPATIENT)
Dept: INTERNAL MEDICINE CLINIC | Facility: CLINIC | Age: 44
End: 2024-02-27
Payer: COMMERCIAL

## 2024-02-27 VITALS
WEIGHT: 315 LBS | HEIGHT: 74 IN | TEMPERATURE: 97.5 F | SYSTOLIC BLOOD PRESSURE: 129 MMHG | OXYGEN SATURATION: 96 % | DIASTOLIC BLOOD PRESSURE: 82 MMHG | BODY MASS INDEX: 40.43 KG/M2 | HEART RATE: 85 BPM

## 2024-02-27 DIAGNOSIS — I10 ESSENTIAL (PRIMARY) HYPERTENSION: ICD-10-CM

## 2024-02-27 DIAGNOSIS — J30.9 ALLERGIC RHINITIS, UNSPECIFIED SEASONALITY, UNSPECIFIED TRIGGER: ICD-10-CM

## 2024-02-27 DIAGNOSIS — R20.2 PARESTHESIA OF RIGHT ARM: ICD-10-CM

## 2024-02-27 DIAGNOSIS — M54.2 NECK PAIN ON RIGHT SIDE: Primary | ICD-10-CM

## 2024-02-27 DIAGNOSIS — E03.9 ACQUIRED HYPOTHYROIDISM: ICD-10-CM

## 2024-02-27 DIAGNOSIS — R73.03 PRE-DIABETES: ICD-10-CM

## 2024-02-27 PROCEDURE — 99214 OFFICE O/P EST MOD 30 MIN: CPT | Performed by: INTERNAL MEDICINE

## 2024-02-27 PROCEDURE — 3079F DIAST BP 80-89 MM HG: CPT | Performed by: INTERNAL MEDICINE

## 2024-02-27 PROCEDURE — 3074F SYST BP LT 130 MM HG: CPT | Performed by: INTERNAL MEDICINE

## 2024-02-27 RX ORDER — LOSARTAN POTASSIUM 100 MG/1
100 TABLET ORAL DAILY
Qty: 90 TABLET | Refills: 1 | Status: SHIPPED | OUTPATIENT
Start: 2024-02-27

## 2024-02-27 RX ORDER — HYDROCHLOROTHIAZIDE 25 MG/1
25 TABLET ORAL DAILY
Qty: 90 TABLET | Refills: 1 | Status: SHIPPED | OUTPATIENT
Start: 2024-02-27

## 2024-02-27 RX ORDER — LEVOTHYROXINE SODIUM 175 UG/1
175 TABLET ORAL
Qty: 90 TABLET | Refills: 1 | Status: SHIPPED | OUTPATIENT
Start: 2024-02-27

## 2024-02-27 RX ORDER — MONTELUKAST SODIUM 10 MG/1
10 TABLET ORAL DAILY
Qty: 90 TABLET | Refills: 1 | Status: SHIPPED | OUTPATIENT
Start: 2024-02-27

## 2024-02-27 ASSESSMENT — ENCOUNTER SYMPTOMS: SHORTNESS OF BREATH: 0

## 2024-02-27 NOTE — PROGRESS NOTES
thyroid replacement.   Lab Results   Component Value Date    TSH 2.790 11/05/2021    [id number] 0.958 10/23/2023   .      Pre-diabetes  Could not tolerate metformin but has been taking Rybelsus 14 mg and doing well with it.  Weight continues to decrease  Hemoglobin A1C   Date Value Ref Range Status   10/23/2023 5.9 (H) 4.8 - 5.6 % Final     Neck pain radiating down right arm  Completed meloxicam and baclofen with minimal to no benefit.  Requests consult      Past Medical History:  Past Medical History:   Diagnosis Date    Acquired hypothyroidism     Allergic rhinitis, cause unspecified     Essential (primary) hypertension 1/18/2017    Hashimoto's thyroiditis x 13 yrs    Hypercholesterolemia     Morbidly obese (HCC)     bmi=36     Past Surgical History:  Past Surgical History:   Procedure Laterality Date    HEENT  1995    jaw set--- no limitation per pt     Allergies:   No Known Allergies  Medications:   Current Outpatient Medications   Medication Sig Dispense Refill    hydroCHLOROthiazide (HYDRODIURIL) 25 MG tablet Take 1 tablet by mouth daily 90 tablet 1    levothyroxine (SYNTHROID) 175 MCG tablet Take 1 tablet by mouth every morning (before breakfast) 90 tablet 1    losartan (COZAAR) 100 MG tablet Take 1 tablet by mouth daily 90 tablet 1    montelukast (SINGULAIR) 10 MG tablet Take 1 tablet by mouth daily 90 tablet 1    Semaglutide 14 MG TABS Take 14 mg by mouth every morning (before breakfast) 90 tablet 1     No current facility-administered medications for this visit.     Social History:  Social History     Tobacco Use    Smoking status: Never    Smokeless tobacco: Never   Substance Use Topics    Alcohol use: Yes     Alcohol/week: 6.0 - 7.0 standard drinks of alcohol     Comment: twice a month     Family History  Family History   Problem Relation Age of Onset    Hypertension Mother     Diabetes Mother     Heart Failure Father        Review of Systems   Constitutional:  Negative for chills, fatigue and fever.

## 2024-03-05 ENCOUNTER — OFFICE VISIT (OUTPATIENT)
Dept: ORTHOPEDIC SURGERY | Age: 44
End: 2024-03-05
Payer: COMMERCIAL

## 2024-03-05 VITALS — WEIGHT: 315 LBS | BODY MASS INDEX: 40.43 KG/M2 | HEIGHT: 74 IN

## 2024-03-05 DIAGNOSIS — M54.12 CERVICAL RADICULOPATHY: ICD-10-CM

## 2024-03-05 DIAGNOSIS — M54.2 NECK PAIN: Primary | ICD-10-CM

## 2024-03-05 PROCEDURE — 99204 OFFICE O/P NEW MOD 45 MIN: CPT | Performed by: NURSE PRACTITIONER

## 2024-03-05 RX ORDER — GABAPENTIN 100 MG/1
100-300 CAPSULE ORAL NIGHTLY
Qty: 90 CAPSULE | Refills: 0 | Status: SHIPPED | OUTPATIENT
Start: 2024-03-05 | End: 2024-04-04

## 2024-03-05 RX ORDER — DICLOFENAC POTASSIUM 50 MG/1
50 TABLET, FILM COATED ORAL 3 TIMES DAILY PRN
Qty: 90 TABLET | Refills: 0 | Status: SHIPPED | OUTPATIENT
Start: 2024-03-05

## 2024-03-05 RX ORDER — METHYLPREDNISOLONE 4 MG/1
TABLET ORAL
Qty: 1 KIT | Refills: 0 | Status: SHIPPED | OUTPATIENT
Start: 2024-03-05

## 2024-03-05 NOTE — PROGRESS NOTES
risks associated with their use, including GI tract or other bleeding, and also some cardiac risk. Instructions were given to discontinue the NSIAD if there is any sign of GI bleed, chest pain, or shortness of breath. A regimen of cataflam  was prescribed. Continued use of NSAIDS is recommended to be managed by PCP in order to monitor kidney and liver function.  -Steroids: A short oral steroid taper was prescribed to try to bring the more acute symptoms under control. The patient understands that there are risks associated with steroids including elevated blood sugar, hypertension, increased risk of infections, and thinning of the bones if taken repeatedly or for prolonged periods. The taper can be followed by NSAIDs once completed  - Gabapentin.  The patient was prescribed an initial regimen of gabapentin.  The medication nay need to be titrated up to a therapeutic level.  Side effects were discussed including dizziness, ataxia, drowsiness, and nystagmus, or blurred vision.         Orders Placed This Encounter   Medications    methylPREDNISolone (MEDROL DOSEPACK) 4 MG tablet     Sig: Take by mouth as directed. Start in morning     Dispense:  1 kit     Refill:  0    diclofenac (CATAFLAM) 50 MG tablet     Sig: Take 1 tablet by mouth 3 times daily as needed for Pain     Dispense:  90 tablet     Refill:  0    gabapentin (NEURONTIN) 100 MG capsule     Sig: Take 1-3 capsules by mouth nightly for 30 days.     Dispense:  90 capsule     Refill:  0        Orders Placed This Encounter   Procedures    XR CERVICAL SPINE (2-3 VIEWS)        4 This is a undiagnosed new problem with uncertain prognosis      Return in about 4 weeks (around 4/2/2024).     DEDRA Jordan - CNP  03/05/24      Elements of this note were created using speech recognition software.  As such, errors of speech recognition may be present.

## 2024-04-02 ENCOUNTER — OFFICE VISIT (OUTPATIENT)
Dept: ORTHOPEDIC SURGERY | Age: 44
End: 2024-04-02
Payer: COMMERCIAL

## 2024-04-02 DIAGNOSIS — M54.2 NECK PAIN: ICD-10-CM

## 2024-04-02 DIAGNOSIS — M54.12 CERVICAL RADICULOPATHY: Primary | ICD-10-CM

## 2024-04-02 PROCEDURE — 99214 OFFICE O/P EST MOD 30 MIN: CPT | Performed by: NURSE PRACTITIONER

## 2024-04-02 RX ORDER — DICLOFENAC POTASSIUM 50 MG/1
50 TABLET, FILM COATED ORAL 2 TIMES DAILY PRN
Qty: 60 TABLET | Refills: 0 | Status: SHIPPED | OUTPATIENT
Start: 2024-04-02

## 2024-04-02 RX ORDER — GABAPENTIN 100 MG/1
100-300 CAPSULE ORAL NIGHTLY
Qty: 90 CAPSULE | Refills: 0 | Status: SHIPPED | OUTPATIENT
Start: 2024-04-02 | End: 2024-05-02

## 2024-04-02 NOTE — PROGRESS NOTES
Name: Mushtaq SEO Echevarria  YOB: 1980  Gender: male  MRN: [id number]    CC: Follow-up neck pain, right arm    HPI: This is a 43 y.o. year old male who presented to me with a 6-week history of complaints of pain in the right neck rating in the posterior arm to his elbow.  He did have intermittent numbness into his hands.  When I saw him the pain was constant pretty significant.  He had tried meloxicam and baclofen with no improvement.  Patient was given a home exercise program, discontinued the meloxicam and baclofen and started on gabapentin, Cataflam and also given a Medrol Dosepak.  He has been performing the exercises and states that he is improved significantly.  He still has a little bit of scapula pain but for the most part feels that this is tolerable.  He was taking the gabapentin as needed.  Also takes Cataflam as needed.  He is yessi discuss continuing this with his primary doctor.  Overall he is pleased with his response.    Thus far, the patient has tried NSAIDS, muscle relaxers, oral steroids, gabapentin or lyrica, and physician directed home exercise program.           3/5/2024     9:06 AM   AMB PAIN ASSESSMENT   Location of Pain Neck   Location Modifiers Right   Severity of Pain 7   Frequency of Pain Constant   Limiting Behavior Yes   Result of Injury No   Work-Related Injury No   Are there other pain locations you wish to document? No        No Known Allergies    Current Outpatient Medications:     diclofenac (CATAFLAM) 50 MG tablet, Take 1 tablet by mouth 2 times daily as needed for Pain, Disp: 60 tablet, Rfl: 0    gabapentin (NEURONTIN) 100 MG capsule, Take 1-3 capsules by mouth nightly for 30 days., Disp: 90 capsule, Rfl: 0    methylPREDNISolone (MEDROL DOSEPACK) 4 MG tablet, Take by mouth as directed. Start in morning, Disp: 1 kit, Rfl: 0    diclofenac (CATAFLAM) 50 MG tablet, Take 1 tablet by mouth 3 times daily as needed for Pain, Disp: 90 tablet, Rfl: 0    gabapentin (NEURONTIN)

## 2024-06-28 ENCOUNTER — OFFICE VISIT (OUTPATIENT)
Dept: INTERNAL MEDICINE CLINIC | Facility: CLINIC | Age: 44
End: 2024-06-28
Payer: COMMERCIAL

## 2024-06-28 VITALS
SYSTOLIC BLOOD PRESSURE: 135 MMHG | BODY MASS INDEX: 41.75 KG/M2 | WEIGHT: 315 LBS | OXYGEN SATURATION: 97 % | HEIGHT: 73 IN | HEART RATE: 70 BPM | TEMPERATURE: 97.9 F | DIASTOLIC BLOOD PRESSURE: 85 MMHG

## 2024-06-28 DIAGNOSIS — I10 ESSENTIAL (PRIMARY) HYPERTENSION: ICD-10-CM

## 2024-06-28 DIAGNOSIS — R73.03 PRE-DIABETES: ICD-10-CM

## 2024-06-28 DIAGNOSIS — J30.9 ALLERGIC RHINITIS, UNSPECIFIED SEASONALITY, UNSPECIFIED TRIGGER: ICD-10-CM

## 2024-06-28 DIAGNOSIS — E03.9 ACQUIRED HYPOTHYROIDISM: ICD-10-CM

## 2024-06-28 PROCEDURE — 3079F DIAST BP 80-89 MM HG: CPT | Performed by: INTERNAL MEDICINE

## 2024-06-28 PROCEDURE — 3075F SYST BP GE 130 - 139MM HG: CPT | Performed by: INTERNAL MEDICINE

## 2024-06-28 PROCEDURE — 99214 OFFICE O/P EST MOD 30 MIN: CPT | Performed by: INTERNAL MEDICINE

## 2024-06-28 RX ORDER — MONTELUKAST SODIUM 10 MG/1
10 TABLET ORAL DAILY
Qty: 90 TABLET | Refills: 1 | Status: SHIPPED | OUTPATIENT
Start: 2024-06-28

## 2024-06-28 RX ORDER — HYDROCHLOROTHIAZIDE 25 MG/1
25 TABLET ORAL DAILY
Qty: 90 TABLET | Refills: 1 | Status: SHIPPED | OUTPATIENT
Start: 2024-06-28

## 2024-06-28 RX ORDER — LOSARTAN POTASSIUM 100 MG/1
100 TABLET ORAL DAILY
Qty: 90 TABLET | Refills: 1 | Status: SHIPPED | OUTPATIENT
Start: 2024-06-28

## 2024-06-28 RX ORDER — LEVOTHYROXINE SODIUM 175 UG/1
175 TABLET ORAL
Qty: 90 TABLET | Refills: 1 | Status: SHIPPED | OUTPATIENT
Start: 2024-06-28

## 2024-06-28 RX ORDER — DICLOFENAC POTASSIUM 50 MG/1
50 TABLET, FILM COATED ORAL 2 TIMES DAILY PRN
Qty: 60 TABLET | Refills: 0 | Status: CANCELLED | OUTPATIENT
Start: 2024-06-28

## 2024-06-28 ASSESSMENT — ENCOUNTER SYMPTOMS: SHORTNESS OF BREATH: 0

## 2024-06-28 NOTE — PROGRESS NOTES
[facility]  Wayne Raymond M.D.  Internal Medicine  [address] [place] [id number]  Office : [phone]  Fax : [phone]    Chief Complaint   Patient presents with    Hypertension       History of Present Illness:  Mushtaq Echevarria is a 43 y.o. male.  HPI    Pre-diabetes  Stopped Rybelsus and Weight continues to increase.  Encouraged him to restart it  Hemoglobin A1C   Date Value Ref Range Status   10/23/2023 5.9 (H) 4.8 - 5.6 % Final     Hypertension  Patient is in for Hypertension which is stable.    Diet and Lifestyle: generally follows a low sodium diet  exercises sporadically  nonsmoker  Home BP Monitoring: is not measured at home .Patient's recent blood pressures were   BP Readings from Last 3 Encounters:   06/28/24 135/85   02/27/24 129/82   01/30/24 132/81   .   taking medications as instructed, no medication side effects noted, no TIA's, no chest pain on exertion, no dyspnea on exertion, no swelling of ankles. Cardiac risk factors consist of diabetes mellitus, dyslipidemia, hypertension, male gender, and obesity (BMI >= 30 kg/m2).  Lab Results   Component Value Date/Time     10/23/2023 09:04 AM    K 3.6 10/23/2023 09:04 AM     10/23/2023 09:04 AM    CO2 27 10/23/2023 09:04 AM    BUN 10 10/23/2023 09:04 AM    CREATININE 1.10 10/23/2023 09:04 AM    GLUCOSE 97 10/23/2023 09:04 AM    CALCIUM 8.9 10/23/2023 09:04 AM    LABGLOM >60 10/23/2023 09:04 AM        Hyperlipidemia  Patient is in for follow-up for hyperlipidemia.  Diet and Lifestyle: generally follows a low fat low cholesterol diet. Risk factors for vascular disease consist of hyperlipidemia and hypertension. Last LDL was   Lab Results   Component Value Date     (H) 10/23/2023   . Last ALT was   Lab Results   Component Value Date/Time    ALT 38 10/23/2023 09:04 AM   .  No muscle aches.    Hypothyroidism  He presents for follow up of  hypothyroidism and reports  no new

## 2024-10-17 ENCOUNTER — OFFICE VISIT (OUTPATIENT)
Dept: INTERNAL MEDICINE CLINIC | Facility: CLINIC | Age: 44
End: 2024-10-17
Payer: COMMERCIAL

## 2024-10-17 VITALS
TEMPERATURE: 97.5 F | HEIGHT: 73 IN | OXYGEN SATURATION: 95 % | DIASTOLIC BLOOD PRESSURE: 80 MMHG | HEART RATE: 76 BPM | BODY MASS INDEX: 41.75 KG/M2 | SYSTOLIC BLOOD PRESSURE: 138 MMHG | WEIGHT: 315 LBS

## 2024-10-17 DIAGNOSIS — E78.49 OTHER HYPERLIPIDEMIA: Primary | ICD-10-CM

## 2024-10-17 DIAGNOSIS — E03.9 ACQUIRED HYPOTHYROIDISM: ICD-10-CM

## 2024-10-17 DIAGNOSIS — Z12.5 SCREENING FOR PROSTATE CANCER: ICD-10-CM

## 2024-10-17 DIAGNOSIS — I10 ESSENTIAL (PRIMARY) HYPERTENSION: ICD-10-CM

## 2024-10-17 DIAGNOSIS — J30.9 ALLERGIC RHINITIS, UNSPECIFIED SEASONALITY, UNSPECIFIED TRIGGER: ICD-10-CM

## 2024-10-17 DIAGNOSIS — R73.03 PRE-DIABETES: ICD-10-CM

## 2024-10-17 LAB
ALBUMIN SERPL-MCNC: 3.9 G/DL (ref 3.5–5)
ALBUMIN/GLOB SERPL: 1.2 (ref 1–1.9)
ALP SERPL-CCNC: 66 U/L (ref 40–129)
ALT SERPL-CCNC: 32 U/L (ref 8–55)
ANION GAP SERPL CALC-SCNC: 12 MMOL/L (ref 9–18)
AST SERPL-CCNC: 32 U/L (ref 15–37)
BASOPHILS # BLD: 0 K/UL (ref 0–0.2)
BASOPHILS NFR BLD: 1 % (ref 0–2)
BILIRUB DIRECT SERPL-MCNC: <0.2 MG/DL (ref 0–0.4)
BILIRUB SERPL-MCNC: 0.3 MG/DL (ref 0–1.2)
BUN SERPL-MCNC: 13 MG/DL (ref 6–23)
CALCIUM SERPL-MCNC: 9.3 MG/DL (ref 8.8–10.2)
CHLORIDE SERPL-SCNC: 100 MMOL/L (ref 98–107)
CHOLEST SERPL-MCNC: 204 MG/DL (ref 0–200)
CO2 SERPL-SCNC: 24 MMOL/L (ref 20–28)
CREAT SERPL-MCNC: 1.06 MG/DL (ref 0.8–1.3)
DIFFERENTIAL METHOD BLD: NORMAL
EOSINOPHIL # BLD: 0.2 K/UL (ref 0–0.8)
EOSINOPHIL NFR BLD: 3 % (ref 0.5–7.8)
ERYTHROCYTE [DISTWIDTH] IN BLOOD BY AUTOMATED COUNT: 12.5 % (ref 11.9–14.6)
EST. AVERAGE GLUCOSE BLD GHB EST-MCNC: 143 MG/DL
GLOBULIN SER CALC-MCNC: 3.2 G/DL (ref 2.3–3.5)
GLUCOSE SERPL-MCNC: 143 MG/DL (ref 70–99)
HBA1C MFR BLD: 6.6 % (ref 0–5.6)
HCT VFR BLD AUTO: 46.7 % (ref 41.1–50.3)
HDLC SERPL-MCNC: 33 MG/DL (ref 40–60)
HDLC SERPL: 6.2 (ref 0–5)
HGB BLD-MCNC: 15.8 G/DL (ref 13.6–17.2)
IMM GRANULOCYTES # BLD AUTO: 0 K/UL (ref 0–0.5)
IMM GRANULOCYTES NFR BLD AUTO: 1 % (ref 0–5)
LDLC SERPL CALC-MCNC: 112 MG/DL (ref 0–100)
LYMPHOCYTES # BLD: 1.6 K/UL (ref 0.5–4.6)
LYMPHOCYTES NFR BLD: 29 % (ref 13–44)
MCH RBC QN AUTO: 30.2 PG (ref 26.1–32.9)
MCHC RBC AUTO-ENTMCNC: 33.8 G/DL (ref 31.4–35)
MCV RBC AUTO: 89.1 FL (ref 82–102)
MONOCYTES # BLD: 0.5 K/UL (ref 0.1–1.3)
MONOCYTES NFR BLD: 8 % (ref 4–12)
NEUTS SEG # BLD: 3.4 K/UL (ref 1.7–8.2)
NEUTS SEG NFR BLD: 58 % (ref 43–78)
NRBC # BLD: 0 K/UL (ref 0–0.2)
PLATELET # BLD AUTO: 286 K/UL (ref 150–450)
PMV BLD AUTO: 10.5 FL (ref 9.4–12.3)
POTASSIUM SERPL-SCNC: 3.6 MMOL/L (ref 3.5–5.1)
PROT SERPL-MCNC: 7.1 G/DL (ref 6.3–8.2)
PSA SERPL-MCNC: 1.2 NG/ML (ref 0–4)
RBC # BLD AUTO: 5.24 M/UL (ref 4.23–5.6)
SODIUM SERPL-SCNC: 137 MMOL/L (ref 136–145)
TRIGL SERPL-MCNC: 295 MG/DL (ref 0–150)
VLDLC SERPL CALC-MCNC: 59 MG/DL (ref 6–23)
WBC # BLD AUTO: 5.7 K/UL (ref 4.3–11.1)

## 2024-10-17 PROCEDURE — 3075F SYST BP GE 130 - 139MM HG: CPT | Performed by: INTERNAL MEDICINE

## 2024-10-17 PROCEDURE — 99214 OFFICE O/P EST MOD 30 MIN: CPT | Performed by: INTERNAL MEDICINE

## 2024-10-17 PROCEDURE — 3079F DIAST BP 80-89 MM HG: CPT | Performed by: INTERNAL MEDICINE

## 2024-10-17 RX ORDER — LOSARTAN POTASSIUM 100 MG/1
100 TABLET ORAL DAILY
Qty: 90 TABLET | Refills: 1 | Status: SHIPPED | OUTPATIENT
Start: 2024-10-17

## 2024-10-17 RX ORDER — MONTELUKAST SODIUM 10 MG/1
10 TABLET ORAL DAILY
Qty: 90 TABLET | Refills: 1 | Status: SHIPPED | OUTPATIENT
Start: 2024-10-17

## 2024-10-17 RX ORDER — HYDROCHLOROTHIAZIDE 25 MG/1
25 TABLET ORAL DAILY
Qty: 90 TABLET | Refills: 1 | Status: SHIPPED | OUTPATIENT
Start: 2024-10-17

## 2024-10-17 RX ORDER — LEVOTHYROXINE SODIUM 175 UG/1
175 TABLET ORAL
Qty: 90 TABLET | Refills: 1 | Status: SHIPPED | OUTPATIENT
Start: 2024-10-17

## 2024-10-17 SDOH — ECONOMIC STABILITY: INCOME INSECURITY: HOW HARD IS IT FOR YOU TO PAY FOR THE VERY BASICS LIKE FOOD, HOUSING, MEDICAL CARE, AND HEATING?: NOT HARD AT ALL

## 2024-10-17 SDOH — ECONOMIC STABILITY: FOOD INSECURITY: WITHIN THE PAST 12 MONTHS, THE FOOD YOU BOUGHT JUST DIDN'T LAST AND YOU DIDN'T HAVE MONEY TO GET MORE.: NEVER TRUE

## 2024-10-17 SDOH — ECONOMIC STABILITY: FOOD INSECURITY: WITHIN THE PAST 12 MONTHS, YOU WORRIED THAT YOUR FOOD WOULD RUN OUT BEFORE YOU GOT MONEY TO BUY MORE.: NEVER TRUE

## 2024-10-17 ASSESSMENT — PATIENT HEALTH QUESTIONNAIRE - PHQ9
SUM OF ALL RESPONSES TO PHQ9 QUESTIONS 1 & 2: 0
SUM OF ALL RESPONSES TO PHQ QUESTIONS 1-9: 0
SUM OF ALL RESPONSES TO PHQ QUESTIONS 1-9: 0
2. FEELING DOWN, DEPRESSED OR HOPELESS: NOT AT ALL
SUM OF ALL RESPONSES TO PHQ QUESTIONS 1-9: 0
SUM OF ALL RESPONSES TO PHQ QUESTIONS 1-9: 0
1. LITTLE INTEREST OR PLEASURE IN DOING THINGS: NOT AT ALL

## 2024-10-17 ASSESSMENT — ENCOUNTER SYMPTOMS: SHORTNESS OF BREATH: 0

## 2024-10-17 ASSESSMENT — ANXIETY QUESTIONNAIRES
6. BECOMING EASILY ANNOYED OR IRRITABLE: NOT AT ALL
IF YOU CHECKED OFF ANY PROBLEMS ON THIS QUESTIONNAIRE, HOW DIFFICULT HAVE THESE PROBLEMS MADE IT FOR YOU TO DO YOUR WORK, TAKE CARE OF THINGS AT HOME, OR GET ALONG WITH OTHER PEOPLE: NOT DIFFICULT AT ALL
3. WORRYING TOO MUCH ABOUT DIFFERENT THINGS: NOT AT ALL
7. FEELING AFRAID AS IF SOMETHING AWFUL MIGHT HAPPEN: NOT AT ALL
5. BEING SO RESTLESS THAT IT IS HARD TO SIT STILL: NOT AT ALL
4. TROUBLE RELAXING: NOT AT ALL
GAD7 TOTAL SCORE: 0
1. FEELING NERVOUS, ANXIOUS, OR ON EDGE: NOT AT ALL
2. NOT BEING ABLE TO STOP OR CONTROL WORRYING: NOT AT ALL

## 2024-10-17 NOTE — PROGRESS NOTES
[facility]  Wayne Raymond M.D.  Internal Medicine  [address] [place] [id number]  Office : [phone]  Fax : [phone]    Chief Complaint   Patient presents with    Hypertension     4 mo follow up       History of Present Illness:  Mushtaq Echevarria is a 44 y.o. male.  HPI    Pre-Diabetes  Hemoglobin A1C   Date Value Ref Range Status   10/23/2023 5.9 (H) 4.8 - 5.6 % Final         Hypertension  Patient is in for Hypertension which is stable.    Diet and Lifestyle: generally follows a low sodium diet  exercises sporadically  nonsmoker  Home BP Monitoring: is not measured at home .Patient's recent blood pressures were   BP Readings from Last 3 Encounters:   10/17/24 138/80   06/28/24 135/85   02/27/24 129/82   .   taking medications as instructed, no medication side effects noted, no TIA's, no chest pain on exertion, no dyspnea on exertion, no swelling of ankles. Cardiac risk factors consist of dyslipidemia, hypertension, male gender, and obesity (BMI >= 30 kg/m2).  Lab Results   Component Value Date/Time     10/23/2023 09:04 AM    K 3.6 10/23/2023 09:04 AM     10/23/2023 09:04 AM    CO2 27 10/23/2023 09:04 AM    BUN 10 10/23/2023 09:04 AM    CREATININE 1.10 10/23/2023 09:04 AM    GLUCOSE 97 10/23/2023 09:04 AM    CALCIUM 8.9 10/23/2023 09:04 AM    LABGLOM >60 10/23/2023 09:04 AM        Hyperlipidemia  Patient is in for follow-up for hyperlipidemia.  Diet and Lifestyle: generally follows a low fat low cholesterol diet. Risk factors for vascular disease consist of hyperlipidemia and hypertension. Last LDL was   Lab Results   Component Value Date     (H) 10/23/2023   . Last ALT was   Lab Results   Component Value Date/Time    ALT 38 10/23/2023 09:04 AM   .  No muscle aches.    Hypothyroidism  He presents for follow up of  hypothyroidism and reports  no new problems.  . He reports his symptoms are  stable.   The patient currently is taking

## 2024-10-18 PROBLEM — E11.9 TYPE 2 DIABETES MELLITUS WITHOUT COMPLICATION, WITHOUT LONG-TERM CURRENT USE OF INSULIN (HCC): Status: ACTIVE | Noted: 2024-10-18

## 2024-10-22 ENCOUNTER — TELEPHONE (OUTPATIENT)
Dept: INTERNAL MEDICINE CLINIC | Facility: CLINIC | Age: 44
End: 2024-10-22

## 2024-10-22 NOTE — TELEPHONE ENCOUNTER
----- Message from Dr. Wayne Raymond MD sent at 10/18/2024  7:09 AM EDT -----  Labs now indicate he has developed type 2 diabetes, so recommend a low carbohydrate diet to promote weight loss.   Also would recommend he start metformin and Rybelsus to assist in weight loss. Please inform the patient\

## 2024-11-26 ENCOUNTER — OFFICE VISIT (OUTPATIENT)
Dept: INTERNAL MEDICINE CLINIC | Facility: CLINIC | Age: 44
End: 2024-11-26
Payer: COMMERCIAL

## 2024-11-26 VITALS
HEART RATE: 97 BPM | DIASTOLIC BLOOD PRESSURE: 80 MMHG | TEMPERATURE: 97.4 F | WEIGHT: 315 LBS | HEIGHT: 73 IN | BODY MASS INDEX: 41.75 KG/M2 | OXYGEN SATURATION: 97 % | SYSTOLIC BLOOD PRESSURE: 132 MMHG

## 2024-11-26 DIAGNOSIS — H65.02 ACUTE SEROUS OTITIS MEDIA OF LEFT EAR, RECURRENCE NOT SPECIFIED: Primary | ICD-10-CM

## 2024-11-26 PROCEDURE — 3079F DIAST BP 80-89 MM HG: CPT | Performed by: INTERNAL MEDICINE

## 2024-11-26 PROCEDURE — 3075F SYST BP GE 130 - 139MM HG: CPT | Performed by: INTERNAL MEDICINE

## 2024-11-26 PROCEDURE — 99213 OFFICE O/P EST LOW 20 MIN: CPT | Performed by: INTERNAL MEDICINE

## 2024-11-26 RX ORDER — AZITHROMYCIN 250 MG/1
TABLET, FILM COATED ORAL
Qty: 6 TABLET | Refills: 0 | Status: SHIPPED | OUTPATIENT
Start: 2024-11-26 | End: 2024-12-06

## 2024-11-26 ASSESSMENT — ENCOUNTER SYMPTOMS
SHORTNESS OF BREATH: 0
COUGH: 0

## 2024-11-26 NOTE — PROGRESS NOTES
[facility]  Wayne Raymond M.D.  Internal Medicine  [address] [place] [address]  Office : [phone]  Fax : [phone]    Chief Complaint   Patient presents with    Sinusitis     Sinus pressure and ear pressure for a week went to urgent care finished up antibiotics and still not cleared up       History of Present Illness:  Mushtaq Echevarria is a 44 y.o. male.  Sinusitis  This is a new problem. The current episode started 1 to 4 weeks ago. The problem has been rapidly improving since onset. There has been no fever. Pertinent negatives include no chills, coughing, diaphoresis, ear pain or shortness of breath. Past treatments include antibiotics. The treatment provided moderate relief.   Finished amoxicillin a week ago        Past Medical History:  Past Medical History:   Diagnosis Date    Acquired hypothyroidism     Allergic rhinitis, cause unspecified     Essential (primary) hypertension 1/18/2017    Hashimoto's thyroiditis x 13 yrs    Hypercholesterolemia     Morbidly obese     bmi=36     Past Surgical History:  Past Surgical History:   Procedure Laterality Date    HEENT  1995    jaw set--- no limitation per pt     Allergies:   No Known Allergies  Medications:   Current Outpatient Medications   Medication Sig Dispense Refill    azithromycin (ZITHROMAX) 250 MG tablet 500mg on day 1 followed by 250mg on days 2 - 5 6 tablet 0    metFORMIN (GLUCOPHAGE) 500 MG tablet Take 1 tablet by mouth 2 times daily (with meals) 180 tablet 1    hydroCHLOROthiazide (HYDRODIURIL) 25 MG tablet Take 1 tablet by mouth daily 90 tablet 1    levothyroxine (SYNTHROID) 175 MCG tablet Take 1 tablet by mouth every morning (before breakfast) 90 tablet 1    losartan (COZAAR) 100 MG tablet Take 1 tablet by mouth daily 90 tablet 1    montelukast (SINGULAIR) 10 MG tablet Take 1 tablet by mouth daily 90 tablet 1    Semaglutide 14 MG TABS Take 14 mg by mouth every morning (before  normal for race

## 2025-02-18 ENCOUNTER — OFFICE VISIT (OUTPATIENT)
Dept: INTERNAL MEDICINE CLINIC | Facility: CLINIC | Age: 45
End: 2025-02-18
Payer: COMMERCIAL

## 2025-02-18 VITALS
DIASTOLIC BLOOD PRESSURE: 80 MMHG | OXYGEN SATURATION: 98 % | HEIGHT: 73 IN | SYSTOLIC BLOOD PRESSURE: 147 MMHG | BODY MASS INDEX: 41.75 KG/M2 | TEMPERATURE: 97.2 F | HEART RATE: 78 BPM | WEIGHT: 315 LBS

## 2025-02-18 DIAGNOSIS — I10 ESSENTIAL (PRIMARY) HYPERTENSION: ICD-10-CM

## 2025-02-18 DIAGNOSIS — E11.9 TYPE 2 DIABETES MELLITUS WITHOUT COMPLICATION, WITHOUT LONG-TERM CURRENT USE OF INSULIN (HCC): ICD-10-CM

## 2025-02-18 DIAGNOSIS — E03.9 ACQUIRED HYPOTHYROIDISM: ICD-10-CM

## 2025-02-18 DIAGNOSIS — J30.9 ALLERGIC RHINITIS, UNSPECIFIED SEASONALITY, UNSPECIFIED TRIGGER: ICD-10-CM

## 2025-02-18 LAB
CREAT UR-MCNC: 84.6 MG/DL (ref 39–259)
MICROALBUMIN UR-MCNC: <1.2 MG/DL (ref 0–20)
MICROALBUMIN/CREAT UR-RTO: NORMAL MG/G (ref 0–30)
TSH, 3RD GENERATION: 1.4 UIU/ML (ref 0.27–4.2)

## 2025-02-18 PROCEDURE — 3077F SYST BP >= 140 MM HG: CPT | Performed by: INTERNAL MEDICINE

## 2025-02-18 PROCEDURE — 99214 OFFICE O/P EST MOD 30 MIN: CPT | Performed by: INTERNAL MEDICINE

## 2025-02-18 PROCEDURE — 3079F DIAST BP 80-89 MM HG: CPT | Performed by: INTERNAL MEDICINE

## 2025-02-18 RX ORDER — LEVOTHYROXINE SODIUM 175 UG/1
175 TABLET ORAL
Qty: 90 TABLET | Refills: 1 | Status: SHIPPED | OUTPATIENT
Start: 2025-02-18

## 2025-02-18 RX ORDER — LOSARTAN POTASSIUM 100 MG/1
100 TABLET ORAL DAILY
Qty: 90 TABLET | Refills: 1 | Status: SHIPPED | OUTPATIENT
Start: 2025-02-18

## 2025-02-18 RX ORDER — MONTELUKAST SODIUM 10 MG/1
10 TABLET ORAL DAILY
Qty: 90 TABLET | Refills: 1 | Status: CANCELLED | OUTPATIENT
Start: 2025-02-18

## 2025-02-18 SDOH — ECONOMIC STABILITY: FOOD INSECURITY: WITHIN THE PAST 12 MONTHS, YOU WORRIED THAT YOUR FOOD WOULD RUN OUT BEFORE YOU GOT MONEY TO BUY MORE.: NEVER TRUE

## 2025-02-18 SDOH — ECONOMIC STABILITY: FOOD INSECURITY: WITHIN THE PAST 12 MONTHS, THE FOOD YOU BOUGHT JUST DIDN'T LAST AND YOU DIDN'T HAVE MONEY TO GET MORE.: NEVER TRUE

## 2025-02-18 ASSESSMENT — PATIENT HEALTH QUESTIONNAIRE - PHQ9
2. FEELING DOWN, DEPRESSED OR HOPELESS: NOT AT ALL
SUM OF ALL RESPONSES TO PHQ QUESTIONS 1-9: 0
SUM OF ALL RESPONSES TO PHQ9 QUESTIONS 1 & 2: 0
SUM OF ALL RESPONSES TO PHQ QUESTIONS 1-9: 0
SUM OF ALL RESPONSES TO PHQ QUESTIONS 1-9: 0
1. LITTLE INTEREST OR PLEASURE IN DOING THINGS: NOT AT ALL
SUM OF ALL RESPONSES TO PHQ QUESTIONS 1-9: 0

## 2025-02-18 ASSESSMENT — ANXIETY QUESTIONNAIRES
3. WORRYING TOO MUCH ABOUT DIFFERENT THINGS: NOT AT ALL
1. FEELING NERVOUS, ANXIOUS, OR ON EDGE: NOT AT ALL
5. BEING SO RESTLESS THAT IT IS HARD TO SIT STILL: NOT AT ALL
2. NOT BEING ABLE TO STOP OR CONTROL WORRYING: NOT AT ALL
GAD7 TOTAL SCORE: 0
4. TROUBLE RELAXING: NOT AT ALL
7. FEELING AFRAID AS IF SOMETHING AWFUL MIGHT HAPPEN: NOT AT ALL
6. BECOMING EASILY ANNOYED OR IRRITABLE: NOT AT ALL
IF YOU CHECKED OFF ANY PROBLEMS ON THIS QUESTIONNAIRE, HOW DIFFICULT HAVE THESE PROBLEMS MADE IT FOR YOU TO DO YOUR WORK, TAKE CARE OF THINGS AT HOME, OR GET ALONG WITH OTHER PEOPLE: NOT DIFFICULT AT ALL

## 2025-02-18 ASSESSMENT — ENCOUNTER SYMPTOMS
NAUSEA: 0
SHORTNESS OF BREATH: 0

## 2025-02-18 NOTE — PROGRESS NOTES
[facility]  Wayne Raymond M.D.  Internal Medicine  [address] [place] [address]  Office : [phone]  Fax : [phone]    Chief Complaint   Patient presents with    Hypertension     4 mo follow up       History of Present Illness:  Mushtaq Echevarria is a 44 y.o. male.  HPI    Diabetes Mellitus  Patient presents  for follow up on diabetes.  Onset of symptoms was several years ago. Patient describes symptoms as none. Course to date has been well controlled.Diet and Lifestyle: follows a diabetic diet regularly  exercises sporadically  nonsmoker  Home glucose monitoring:  Results average n/a. Patient denies polyuria and polydipsia. No wounds in lower limbs.  Most recent HbA1c was   Hemoglobin A1C   Date Value Ref Range Status   10/17/2024 6.6 (H) 0 - 5.6 % Final     Comment:     Reference Range  Normal       <5.7%  Prediabetes  5.7-6.4%  Diabetes     >6.4%         Hypertension  Patient is in for Hypertension which is stable.    Diet and Lifestyle: generally follows a low sodium diet  Home BP Monitoring: is not measured at home. Patient's recent blood pressures were   BP Readings from Last 3 Encounters:   02/18/25 (!) 147/80   11/26/24 132/80   10/17/24 138/80   .   taking medications as instructed, no medication side effects noted, no TIA's, no chest pain on exertion, no dyspnea on exertion, no swelling of ankles. Cardiac risk factors consist of diabetes mellitus, dyslipidemia, hypertension, male gender, and obesity (BMI >= 30 kg/m2).  Lab Results   Component Value Date/Time     10/17/2024 01:51 PM    K 3.6 10/17/2024 01:51 PM     10/17/2024 01:51 PM    CO2 24 10/17/2024 01:51 PM    BUN 13 10/17/2024 01:51 PM    CREATININE 1.06 10/17/2024 01:51 PM    GLUCOSE 143 10/17/2024 01:51 PM    CALCIUM 9.3 10/17/2024 01:51 PM    LABGLOM 88 10/17/2024 01:51 PM    LABGLOM >60 10/23/2023 09:04 AM        Hyperlipidemia  Patient is in for follow-up for

## 2025-03-05 ENCOUNTER — PATIENT MESSAGE (OUTPATIENT)
Dept: INTERNAL MEDICINE CLINIC | Facility: CLINIC | Age: 45
End: 2025-03-05

## 2025-03-05 DIAGNOSIS — E11.9 TYPE 2 DIABETES MELLITUS WITHOUT COMPLICATION, WITHOUT LONG-TERM CURRENT USE OF INSULIN (HCC): Primary | ICD-10-CM

## 2025-03-10 ENCOUNTER — TELEPHONE (OUTPATIENT)
Dept: INTERNAL MEDICINE CLINIC | Facility: CLINIC | Age: 45
End: 2025-03-10

## 2025-03-10 NOTE — TELEPHONE ENCOUNTER
Prior approval  case [facility] [id number]   2-18-25 thru 2-18-26   for [employer]

## 2025-03-12 DIAGNOSIS — E11.9 TYPE 2 DIABETES MELLITUS WITHOUT COMPLICATION, WITHOUT LONG-TERM CURRENT USE OF INSULIN (HCC): ICD-10-CM

## 2025-04-28 DIAGNOSIS — E11.9 TYPE 2 DIABETES MELLITUS WITHOUT COMPLICATION, WITHOUT LONG-TERM CURRENT USE OF INSULIN (HCC): ICD-10-CM

## 2025-04-28 RX ORDER — TIRZEPATIDE 5 MG/.5ML
INJECTION, SOLUTION SUBCUTANEOUS
Qty: 4 ML | Refills: 0 | OUTPATIENT
Start: 2025-04-28

## 2025-04-30 DIAGNOSIS — E11.9 TYPE 2 DIABETES MELLITUS WITHOUT COMPLICATION, WITHOUT LONG-TERM CURRENT USE OF INSULIN (HCC): ICD-10-CM

## 2025-06-17 ENCOUNTER — PATIENT MESSAGE (OUTPATIENT)
Dept: INTERNAL MEDICINE CLINIC | Facility: CLINIC | Age: 45
End: 2025-06-17

## 2025-06-17 DIAGNOSIS — E11.9 TYPE 2 DIABETES MELLITUS WITHOUT COMPLICATION, WITHOUT LONG-TERM CURRENT USE OF INSULIN (HCC): ICD-10-CM

## 2025-06-18 ENCOUNTER — OFFICE VISIT (OUTPATIENT)
Dept: INTERNAL MEDICINE CLINIC | Facility: CLINIC | Age: 45
End: 2025-06-18
Payer: COMMERCIAL

## 2025-06-18 VITALS
WEIGHT: 311 LBS | HEART RATE: 66 BPM | SYSTOLIC BLOOD PRESSURE: 132 MMHG | HEIGHT: 73 IN | BODY MASS INDEX: 41.22 KG/M2 | OXYGEN SATURATION: 96 % | TEMPERATURE: 97.9 F | DIASTOLIC BLOOD PRESSURE: 80 MMHG

## 2025-06-18 DIAGNOSIS — E03.9 ACQUIRED HYPOTHYROIDISM: ICD-10-CM

## 2025-06-18 DIAGNOSIS — E11.9 TYPE 2 DIABETES MELLITUS WITHOUT COMPLICATION, WITHOUT LONG-TERM CURRENT USE OF INSULIN (HCC): ICD-10-CM

## 2025-06-18 DIAGNOSIS — J30.9 ALLERGIC RHINITIS, UNSPECIFIED SEASONALITY, UNSPECIFIED TRIGGER: ICD-10-CM

## 2025-06-18 DIAGNOSIS — I10 ESSENTIAL (PRIMARY) HYPERTENSION: Primary | ICD-10-CM

## 2025-06-18 LAB
ANION GAP SERPL CALC-SCNC: 11 MMOL/L (ref 7–16)
BUN SERPL-MCNC: 13 MG/DL (ref 6–23)
CALCIUM SERPL-MCNC: 9.5 MG/DL (ref 8.8–10.2)
CHLORIDE SERPL-SCNC: 103 MMOL/L (ref 98–107)
CO2 SERPL-SCNC: 23 MMOL/L (ref 20–29)
CREAT SERPL-MCNC: 1.01 MG/DL (ref 0.8–1.3)
EST. AVERAGE GLUCOSE BLD GHB EST-MCNC: 122 MG/DL
GLUCOSE SERPL-MCNC: 96 MG/DL (ref 70–99)
HBA1C MFR BLD: 5.9 % (ref 0–5.6)
POTASSIUM SERPL-SCNC: 3.9 MMOL/L (ref 3.5–5.1)
SODIUM SERPL-SCNC: 137 MMOL/L (ref 136–145)

## 2025-06-18 PROCEDURE — 99214 OFFICE O/P EST MOD 30 MIN: CPT | Performed by: INTERNAL MEDICINE

## 2025-06-18 PROCEDURE — 3079F DIAST BP 80-89 MM HG: CPT | Performed by: INTERNAL MEDICINE

## 2025-06-18 PROCEDURE — 3075F SYST BP GE 130 - 139MM HG: CPT | Performed by: INTERNAL MEDICINE

## 2025-06-18 RX ORDER — HYDROCHLOROTHIAZIDE 25 MG/1
25 TABLET ORAL DAILY
Qty: 90 TABLET | Refills: 1 | Status: SHIPPED | OUTPATIENT
Start: 2025-06-18

## 2025-06-18 RX ORDER — MONTELUKAST SODIUM 10 MG/1
10 TABLET ORAL DAILY
Qty: 90 TABLET | Refills: 1 | Status: CANCELLED | OUTPATIENT
Start: 2025-06-18

## 2025-06-18 RX ORDER — LEVOTHYROXINE SODIUM 175 UG/1
175 TABLET ORAL
Qty: 90 TABLET | Refills: 1 | Status: SHIPPED | OUTPATIENT
Start: 2025-06-18

## 2025-06-18 RX ORDER — LOSARTAN POTASSIUM 100 MG/1
100 TABLET ORAL DAILY
Qty: 90 TABLET | Refills: 1 | Status: SHIPPED | OUTPATIENT
Start: 2025-06-18

## 2025-06-18 SDOH — ECONOMIC STABILITY: FOOD INSECURITY: WITHIN THE PAST 12 MONTHS, YOU WORRIED THAT YOUR FOOD WOULD RUN OUT BEFORE YOU GOT MONEY TO BUY MORE.: NEVER TRUE

## 2025-06-18 SDOH — ECONOMIC STABILITY: FOOD INSECURITY: WITHIN THE PAST 12 MONTHS, THE FOOD YOU BOUGHT JUST DIDN'T LAST AND YOU DIDN'T HAVE MONEY TO GET MORE.: NEVER TRUE

## 2025-06-18 ASSESSMENT — ENCOUNTER SYMPTOMS: SHORTNESS OF BREATH: 0

## 2025-06-18 ASSESSMENT — PATIENT HEALTH QUESTIONNAIRE - PHQ9
2. FEELING DOWN, DEPRESSED OR HOPELESS: NOT AT ALL
1. LITTLE INTEREST OR PLEASURE IN DOING THINGS: NOT AT ALL
SUM OF ALL RESPONSES TO PHQ QUESTIONS 1-9: 0

## 2025-06-18 NOTE — PROGRESS NOTES
[facility]  Wayne Raymond M.D.  Internal Medicine  [address] [place] [address]  Office : [phone]  Fax : [phone]    Chief Complaint   Patient presents with    Hyperlipidemia     4 mo follow up       History of Present Illness:  Mushtaq Echevarria is a 44 y.o. male.  HPI    Diabetes Mellitus  Patient presents  for follow up on diabetes.  Onset of symptoms was several years ago. Patient describes symptoms as none. Course to date has been well controlled.Diet and Lifestyle: follows a diabetic diet regularly  exercises sporadically  nonsmoker  Home glucose monitoring:  Results average n/a. Patient denies polyuria and polydipsia. No wounds in lower limbs.  Most recent HbA1c was   Hemoglobin A1C   Date Value Ref Range Status   10/17/2024 6.6 (H) 0 - 5.6 % Final     Comment:     Reference Range  Normal       <5.7%  Prediabetes  5.7-6.4%  Diabetes     >6.4%         Hypertension  Patient is in for Hypertension which is decreasing in severity.    Diet and Lifestyle: generally follows a low sodium diet  Home BP Monitoring: is not measured at home. Patient's recent blood pressures were   BP Readings from Last 3 Encounters:   06/18/25 132/80   02/18/25 (!) 147/80   11/26/24 132/80   .   taking medications as instructed, no medication side effects noted, no TIA's, no chest pain on exertion, no dyspnea on exertion, no swelling of ankles. Cardiac risk factors consist of diabetes mellitus, dyslipidemia, hypertension, male gender, and obesity (BMI >= 30 kg/m2).  Lab Results   Component Value Date/Time     10/17/2024 01:51 PM    K 3.6 10/17/2024 01:51 PM     10/17/2024 01:51 PM    CO2 24 10/17/2024 01:51 PM    BUN 13 10/17/2024 01:51 PM    CREATININE 1.06 10/17/2024 01:51 PM    GLUCOSE 143 10/17/2024 01:51 PM    CALCIUM 9.3 10/17/2024 01:51 PM    LABGLOM 88 10/17/2024 01:51 PM    LABGLOM >60 10/23/2023 09:04 AM        Hyperlipidemia  Patient is in for

## 2025-06-19 ENCOUNTER — RESULTS FOLLOW-UP (OUTPATIENT)
Dept: INTERNAL MEDICINE CLINIC | Facility: CLINIC | Age: 45
End: 2025-06-19

## 2025-08-07 ENCOUNTER — PATIENT MESSAGE (OUTPATIENT)
Dept: INTERNAL MEDICINE CLINIC | Facility: CLINIC | Age: 45
End: 2025-08-07

## 2025-08-07 ENCOUNTER — OFFICE VISIT (OUTPATIENT)
Dept: INTERNAL MEDICINE CLINIC | Facility: CLINIC | Age: 45
End: 2025-08-07
Payer: COMMERCIAL

## 2025-08-07 VITALS
TEMPERATURE: 97.2 F | BODY MASS INDEX: 40.43 KG/M2 | WEIGHT: 315 LBS | HEIGHT: 74 IN | HEART RATE: 86 BPM | OXYGEN SATURATION: 97 % | SYSTOLIC BLOOD PRESSURE: 132 MMHG | DIASTOLIC BLOOD PRESSURE: 80 MMHG

## 2025-08-07 DIAGNOSIS — E66.01 OBESITY, MORBID (HCC): Primary | ICD-10-CM

## 2025-08-07 DIAGNOSIS — E11.9 TYPE 2 DIABETES MELLITUS WITHOUT COMPLICATION, WITHOUT LONG-TERM CURRENT USE OF INSULIN (HCC): ICD-10-CM

## 2025-08-07 DIAGNOSIS — K21.9 GASTROESOPHAGEAL REFLUX DISEASE, UNSPECIFIED WHETHER ESOPHAGITIS PRESENT: ICD-10-CM

## 2025-08-07 DIAGNOSIS — J30.9 ALLERGIC RHINITIS, UNSPECIFIED SEASONALITY, UNSPECIFIED TRIGGER: ICD-10-CM

## 2025-08-07 DIAGNOSIS — J32.9 CHRONIC CONGESTION OF PARANASAL SINUS: Primary | ICD-10-CM

## 2025-08-07 PROCEDURE — 3075F SYST BP GE 130 - 139MM HG: CPT | Performed by: INTERNAL MEDICINE

## 2025-08-07 PROCEDURE — 3079F DIAST BP 80-89 MM HG: CPT | Performed by: INTERNAL MEDICINE

## 2025-08-07 PROCEDURE — 99214 OFFICE O/P EST MOD 30 MIN: CPT | Performed by: INTERNAL MEDICINE

## 2025-08-07 RX ORDER — MONTELUKAST SODIUM 10 MG/1
10 TABLET ORAL DAILY
Qty: 90 TABLET | Refills: 1 | Status: SHIPPED | OUTPATIENT
Start: 2025-08-07

## 2025-08-07 RX ORDER — TIRZEPATIDE 10 MG/.5ML
INJECTION, SOLUTION SUBCUTANEOUS
Qty: 4 ML | Refills: 0 | OUTPATIENT
Start: 2025-08-07

## 2025-08-07 ASSESSMENT — ENCOUNTER SYMPTOMS
SINUS COMPLAINT: 1
WHEEZING: 0
SINUS PRESSURE: 1